# Patient Record
Sex: FEMALE | Race: WHITE | NOT HISPANIC OR LATINO | Employment: FULL TIME | URBAN - METROPOLITAN AREA
[De-identification: names, ages, dates, MRNs, and addresses within clinical notes are randomized per-mention and may not be internally consistent; named-entity substitution may affect disease eponyms.]

---

## 2017-01-04 ENCOUNTER — HOSPITAL ENCOUNTER (OUTPATIENT)
Dept: OTHER | Facility: HOSPITAL | Age: 57
Discharge: HOME | End: 2017-01-04
Attending: OBSTETRICS & GYNECOLOGY | Admitting: OBSTETRICS & GYNECOLOGY

## 2017-01-17 ENCOUNTER — HOSPITAL ENCOUNTER (OUTPATIENT)
Dept: MAMMOGRAPHY | Facility: HOSPITAL | Age: 57
Discharge: HOME | End: 2017-01-17
Attending: OBSTETRICS & GYNECOLOGY | Admitting: OBSTETRICS & GYNECOLOGY

## 2017-04-07 ENCOUNTER — HOSPITAL ENCOUNTER (OUTPATIENT)
Dept: OTHER | Facility: HOSPITAL | Age: 57
Discharge: HOME | End: 2017-04-07
Attending: FAMILY MEDICINE | Admitting: FAMILY MEDICINE

## 2017-07-21 ENCOUNTER — HOSPITAL ENCOUNTER (OUTPATIENT)
Dept: MAMMOGRAPHY | Facility: HOSPITAL | Age: 57
Discharge: HOME | End: 2017-07-21
Attending: OBSTETRICS & GYNECOLOGY | Admitting: OBSTETRICS & GYNECOLOGY

## 2018-07-13 ENCOUNTER — HOSPITAL ENCOUNTER (OUTPATIENT)
Dept: MAMMOGRAPHY | Facility: HOSPITAL | Age: 58
Discharge: HOME | End: 2018-07-13
Attending: OBSTETRICS & GYNECOLOGY | Admitting: OBSTETRICS & GYNECOLOGY

## 2018-08-13 ENCOUNTER — CONVERSION ENCOUNTER (OUTPATIENT)
Dept: FAMILY MEDICINE CLINIC | Facility: CLINIC | Age: 58
End: 2018-08-13

## 2018-08-13 LAB
ALBUMIN SERPL-MCNC: 4.4 G/DL (ref 3.6–5.1)
ALP SERPL-CCNC: 62 U/L (ref 33–130)
ALT SERPL-CCNC: 32 U/L (ref 6–29)
ANA SER QL IA: NEGATIVE
AST SERPL-CCNC: 20 U/L (ref 10–35)
BASOPHILS # BLD AUTO: 9 CELLS/UL (ref 0–200)
BASOPHILS NFR BLD AUTO: 0.1 %
BILIRUB SERPL-MCNC: 0.4 MG/DL (ref 0.2–1.2)
BUN SERPL-MCNC: 18 MG/DL (ref 7–25)
BUN/CREAT SERPL: ABNORMAL (CALC) (ref 6–22)
CALCIUM SERPL-MCNC: 9.6 MG/DL (ref 8.6–10.4)
CHLORIDE SERPL-SCNC: 104 MMOL/L (ref 98–110)
CHOLEST SERPL-MCNC: 237 MG/DL
CHOLEST/HDLC SERPL: 4 (CALC)
CHROMATIN AB SERPL-ACNC: <14 IU/ML
CONV CO2: 28 MMOL/L (ref 20–32)
CONV TOTAL PROTEIN: 6.8 G/DL (ref 6.1–8.1)
CREAT UR-MCNC: 0.77 MG/DL (ref 0.5–1.05)
CRP SERPL-MCNC: 4.1 MG/L
EOSINOPHIL # BLD AUTO: 0.1 %
EOSINOPHIL # BLD AUTO: 9 CELLS/UL (ref 15–500)
ERYTHROCYTE [DISTWIDTH] IN BLOOD BY AUTOMATED COUNT: 13.6 % (ref 11–15)
GLOBULIN UR ELPH-MCNC: 2.4 MG/DL (ref 1.9–3.7)
GLUCOSE UR QL: 106 MG/DL (ref 65–99)
HCT VFR BLD AUTO: 37.6 % (ref 35–45)
HDLC SERPL-MCNC: 60 MG/DL
HGB BLD-MCNC: 12.7 G/DL (ref 11.7–15.5)
INSULIN SERPL-ACNC: 1.8 (CALC) (ref 1–2.5)
LDLC SERPL CALC-MCNC: 151 MG/DL
LYMPHOCYTES # BLD AUTO: 3818 CELLS/UL (ref 850–3900)
LYMPHOCYTES NFR BLD AUTO: 44.4 %
MCH RBC QN AUTO: 30.5 PG (ref 27–33)
MCHC RBC AUTO-ENTMCNC: 33.8 G/DL (ref 32–36)
MCV RBC AUTO: 90.4 FL (ref 80–100)
MONOCYTES # BLD AUTO: 611 CELLS/UL (ref 200–950)
MONOCYTES NFR BLD AUTO: 7.1 %
NEUTROPHILS # BLD AUTO: 4154 CELLS/UL (ref 1500–7800)
NEUTROPHILS NFR BLD AUTO: 48.3 %
NONHDLC SERPL-MCNC: 177 MG/DL
PLATELET # BLD AUTO: 219 10*3/UL (ref 140–400)
PMV BLD AUTO: 11.4 FL (ref 7.5–12.5)
POTASSIUM SERPL-SCNC: 4.5 MMOL/L (ref 3.5–5.3)
RBC # BLD AUTO: 4.16 MILLION/UL (ref 3.8–5.1)
SODIUM SERPL-SCNC: 140 MMOL/L (ref 135–146)
TRIGL SERPL-MCNC: 140 MG/DL
TSH SERPL-ACNC: 2.77 MIU/L (ref 0.4–4.5)
URATE SERPL-MCNC: 4.7 MG/DL (ref 2.5–7)
WBC # BLD AUTO: 8.6 10*3/UL (ref 3.8–10.8)

## 2019-08-16 DIAGNOSIS — E78.5 HYPERLIPIDEMIA, UNSPECIFIED HYPERLIPIDEMIA TYPE: Primary | ICD-10-CM

## 2019-08-17 LAB
ALBUMIN SERPL-MCNC: 5.2 G/DL (ref 3.5–5.5)
ALBUMIN/GLOB SERPL: 2.5 {RATIO} (ref 1.2–2.2)
ALP SERPL-CCNC: 83 IU/L (ref 39–117)
ALT SERPL-CCNC: 26 IU/L (ref 0–32)
AST SERPL-CCNC: 24 IU/L (ref 0–40)
BASOPHILS # BLD AUTO: 0 X10E3/UL (ref 0–0.2)
BASOPHILS NFR BLD AUTO: 0 %
BILIRUB SERPL-MCNC: 0.4 MG/DL (ref 0–1.2)
BUN SERPL-MCNC: 11 MG/DL (ref 6–24)
BUN/CREAT SERPL: 18 (ref 9–23)
CALCIUM SERPL-MCNC: 10.4 MG/DL (ref 8.7–10.2)
CHLORIDE SERPL-SCNC: 101 MMOL/L (ref 96–106)
CHOLEST SERPL-MCNC: 268 MG/DL (ref 100–199)
CHOLEST/HDLC SERPL: 4.3 RATIO (ref 0–4.4)
CO2 SERPL-SCNC: 24 MMOL/L (ref 20–29)
CREAT SERPL-MCNC: 0.6 MG/DL (ref 0.57–1)
EOSINOPHIL # BLD AUTO: 0.1 X10E3/UL (ref 0–0.4)
EOSINOPHIL NFR BLD AUTO: 2 %
ERYTHROCYTE [DISTWIDTH] IN BLOOD BY AUTOMATED COUNT: 14 % (ref 12.3–15.4)
GLOBULIN SER CALC-MCNC: 2.1 G/DL (ref 1.5–4.5)
GLUCOSE SERPL-MCNC: 104 MG/DL (ref 65–99)
HCT VFR BLD AUTO: 42.5 % (ref 34–46.6)
HDLC SERPL-MCNC: 62 MG/DL
HGB BLD-MCNC: 14.1 G/DL (ref 11.1–15.9)
IMM GRANULOCYTES # BLD AUTO: 0 X10E3/UL (ref 0–0.1)
IMM GRANULOCYTES NFR BLD AUTO: 0 %
LDLC SERPL CALC-MCNC: 176 MG/DL (ref 0–99)
LYMPHOCYTES # BLD AUTO: 2.7 X10E3/UL (ref 0.7–3.1)
LYMPHOCYTES NFR BLD AUTO: 50 %
MCH RBC QN AUTO: 30.8 PG (ref 26.6–33)
MCHC RBC AUTO-ENTMCNC: 33.2 G/DL (ref 31.5–35.7)
MCV RBC AUTO: 93 FL (ref 79–97)
MONOCYTES # BLD AUTO: 0.3 X10E3/UL (ref 0.1–0.9)
MONOCYTES NFR BLD AUTO: 5 %
NEUTROPHILS # BLD AUTO: 2.4 X10E3/UL (ref 1.4–7)
NEUTROPHILS NFR BLD AUTO: 43 %
PLATELET # BLD AUTO: 222 X10E3/UL (ref 150–450)
POTASSIUM SERPL-SCNC: 4.7 MMOL/L (ref 3.5–5.2)
PROT SERPL-MCNC: 7.3 G/DL (ref 6–8.5)
RBC # BLD AUTO: 4.58 X10E6/UL (ref 3.77–5.28)
SODIUM SERPL-SCNC: 145 MMOL/L (ref 134–144)
TRIGL SERPL-MCNC: 150 MG/DL (ref 0–149)
TSH SERPL DL<=0.005 MIU/L-ACNC: 4.63 UIU/ML (ref 0.45–4.5)
VLDLC SERPL CALC-MCNC: 30 MG/DL (ref 5–40)
WBC # BLD AUTO: 5.5 X10E3/UL (ref 3.4–10.8)

## 2019-08-19 DIAGNOSIS — M54.5 LOW BACK PAIN, UNSPECIFIED BACK PAIN LATERALITY, UNSPECIFIED CHRONICITY, WITH SCIATICA PRESENCE UNSPECIFIED: Primary | ICD-10-CM

## 2019-08-20 RX ORDER — MELOXICAM 15 MG/1
TABLET ORAL
Qty: 90 TABLET | Refills: 4 | Status: SHIPPED | OUTPATIENT
Start: 2019-08-20

## 2019-09-06 ENCOUNTER — OFFICE VISIT (OUTPATIENT)
Dept: FAMILY MEDICINE CLINIC | Facility: CLINIC | Age: 59
End: 2019-09-06

## 2019-09-06 VITALS
DIASTOLIC BLOOD PRESSURE: 78 MMHG | SYSTOLIC BLOOD PRESSURE: 138 MMHG | TEMPERATURE: 98.9 F | WEIGHT: 156.6 LBS | HEIGHT: 64 IN | HEART RATE: 103 BPM | BODY MASS INDEX: 26.73 KG/M2 | OXYGEN SATURATION: 98 % | RESPIRATION RATE: 17 BRPM

## 2019-09-06 DIAGNOSIS — E78.2 MIXED HYPERLIPIDEMIA: ICD-10-CM

## 2019-09-06 DIAGNOSIS — M54.41 ACUTE RIGHT-SIDED LOW BACK PAIN WITH RIGHT-SIDED SCIATICA: ICD-10-CM

## 2019-09-06 DIAGNOSIS — E66.3 OVERWEIGHT: ICD-10-CM

## 2019-09-06 DIAGNOSIS — Z00.01 ENCOUNTER FOR ANNUAL GENERAL MEDICAL EXAMINATION WITH ABNORMAL FINDINGS IN ADULT: Primary | ICD-10-CM

## 2019-09-06 DIAGNOSIS — B35.1 ONYCHOMYCOSIS: ICD-10-CM

## 2019-09-06 DIAGNOSIS — Z12.11 COLON CANCER SCREENING: ICD-10-CM

## 2019-09-06 PROBLEM — J45.909 ASTHMA: Status: ACTIVE | Noted: 2019-09-06

## 2019-09-06 PROBLEM — K58.9 IBS (IRRITABLE BOWEL SYNDROME): Status: ACTIVE | Noted: 2019-09-06

## 2019-09-06 PROBLEM — K13.79 LESION OF PALATE: Status: ACTIVE | Noted: 2019-09-06

## 2019-09-06 PROBLEM — R06.02 SHORT OF BREATH ON EXERTION: Status: ACTIVE | Noted: 2019-09-06

## 2019-09-06 PROBLEM — M79.673 FOOT PAIN: Status: ACTIVE | Noted: 2019-09-06

## 2019-09-06 PROBLEM — M19.90 ARTHRITIS: Status: ACTIVE | Noted: 2019-09-06

## 2019-09-06 PROBLEM — M19.90 OA (OSTEOARTHRITIS): Status: ACTIVE | Noted: 2019-09-06

## 2019-09-06 PROBLEM — M54.6 ACUTE BILATERAL THORACIC BACK PAIN: Status: ACTIVE | Noted: 2019-09-06

## 2019-09-06 PROCEDURE — 99396 PREV VISIT EST AGE 40-64: CPT | Performed by: FAMILY MEDICINE

## 2019-09-06 RX ORDER — BIOTIN 2500 MCG
1 CAPSULE ORAL DAILY
COMMUNITY
End: 2020-03-04

## 2019-09-06 RX ORDER — ALBUTEROL SULFATE 2.5 MG/3ML
2.5 SOLUTION RESPIRATORY (INHALATION)
COMMUNITY
Start: 2016-12-16 | End: 2020-03-04

## 2019-09-06 RX ORDER — MONTELUKAST SODIUM 10 MG/1
10 TABLET ORAL DAILY
Refills: 12 | COMMUNITY
Start: 2019-07-15 | End: 2019-10-16 | Stop reason: SDUPTHER

## 2019-09-06 RX ORDER — PROMETHAZINE HYDROCHLORIDE 25 MG/1
.5-1 TABLET ORAL AS NEEDED
COMMUNITY
Start: 2018-12-19 | End: 2022-09-23

## 2019-09-06 RX ORDER — CYCLOBENZAPRINE HCL 10 MG
.5-1 TABLET ORAL NIGHTLY PRN
Refills: 2 | COMMUNITY
Start: 2019-08-18 | End: 2019-09-17 | Stop reason: SDUPTHER

## 2019-09-06 RX ORDER — TERBINAFINE HYDROCHLORIDE 250 MG/1
250 TABLET ORAL DAILY
Qty: 14 TABLET | Refills: 9 | Status: SHIPPED | OUTPATIENT
Start: 2019-09-06 | End: 2020-10-02

## 2019-09-06 RX ORDER — ALBUTEROL SULFATE 90 UG/1
2 AEROSOL, METERED RESPIRATORY (INHALATION) EVERY 4 HOURS
COMMUNITY
Start: 2016-10-07

## 2019-09-06 RX ORDER — ASCORBIC ACID 1000 MG
1 TABLET ORAL DAILY
COMMUNITY
End: 2020-03-04

## 2019-09-06 RX ORDER — NAPROXEN SODIUM 220 MG
TABLET ORAL
COMMUNITY
End: 2020-03-04

## 2019-09-06 NOTE — PROGRESS NOTES
Subjective   Asia Medellin is a 59 y.o. female.     Chief Complaint   Patient presents with   • Annual Exam   • Hyperlipidemia   • Follow-up     elevated tsh 8/16/2019       The patient is here: to discuss health maintenance and disease prevention.  Last comprehensive physical was on 8/10/2018.  Previous physical was performed by  Randolph Wilson MD  Overall has: moderate activity with work/home activities, good appetite, feels well with minor complaints, decreased energy level and is sleeping well. Nutrition: eating a variety of foods. Last tetanus shot was 5-10 years ago. occasionally. Patient's last PAP Smear was: 2018. Unknown. The Patient's last Mammogram was: 7/13/2018. Patient's last colonoscopy was: 11/13/2015.     Hyperlipidemia   This is a chronic problem. The current episode started more than 1 year ago. The problem is uncontrolled. Recent lipid tests were reviewed and are high. Pertinent negatives include no chest pain or shortness of breath.        Recent Hospitalizations:  No hospitalization(s) within the last year..  ccc      I personally reviewed and updated the patient's allergies, medications, problem list, and past medical, surgical, social, and family history.     Family History   Problem Relation Age of Onset   • Diabetes Mother         Mellitus   • Hypertension Mother    • Osteoporosis Mother    • Skin cancer Father    • Leukemia Father    • Diabetes Sister         Mellitus   • Hypertension Sister    • Osteoporosis Sister    • Diabetes Brother         Mellitus   • Hypertension Brother    • Diabetes Maternal Grandmother         Mellitus   • Colon cancer Maternal Grandfather    • Prostate cancer Paternal Grandfather        Social History     Tobacco Use   • Smoking status: Never Smoker   • Smokeless tobacco: Never Used   Substance Use Topics   • Alcohol use: Yes     Frequency: Monthly or less     Comment: Occasional   • Drug use: No       History reviewed. No pertinent surgical history.    Patient  Active Problem List   Diagnosis   • Acute bilateral thoracic back pain   • Acute right-sided low back pain with right-sided sciatica   • Annual visit for general adult medical examination with abnormal findings   • Arthritis   • OA (osteoarthritis)   • Asthma   • Colon cancer screening   • Foot pain   • Mixed hyperlipidemia   • IBS (irritable bowel syndrome)   • Lesion of palate   • Overweight   • Short of breath on exertion   • Onychomycosis         Current Outpatient Medications:   •  albuterol (PROVENTIL) (2.5 MG/3ML) 0.083% nebulizer solution, Inhale 2.5 mg., Disp: , Rfl:   •  albuterol sulfate HFA (PROAIR HFA) 108 (90 Base) MCG/ACT inhaler, Inhale 2 puffs Every 4 (Four) Hours., Disp: , Rfl:   •  Biotin 2500 MCG capsule, Take 1 capsule by mouth Daily., Disp: , Rfl:   •  CINNAMON PO, Take  by mouth., Disp: , Rfl:   •  cyclobenzaprine (FLEXERIL) 10 MG tablet, Take 0.5-1 tablets by mouth At Night As Needed., Disp: , Rfl: 2  •  Ginger, Zingiber officinalis, (GINGER ROOT) 500 MG capsule, Take 1 capsule by mouth Daily., Disp: , Rfl:   •  meloxicam (MOBIC) 15 MG tablet, TAKE 1 TABLET BY MOUTH EVERY DAY, Disp: 90 tablet, Rfl: 4  •  montelukast (SINGULAIR) 10 MG tablet, Take 10 mg by mouth Daily., Disp: , Rfl: 12  •  naproxen sodium (ALEVE) 220 MG tablet, Take  by mouth., Disp: , Rfl:   •  promethazine (PHENERGAN) 25 MG tablet, Take 0.5-1 tablets by mouth As Needed for Nausea., Disp: , Rfl:   •  Turmeric Curcumin 500 MG capsule, Take 1 capsule by mouth Daily., Disp: , Rfl:   •  Multiple Vitamins-Minerals (MULTIVITAMIN ADULT PO), Take  by mouth Daily., Disp: , Rfl:   •  Plant Sterols and Stanols (CHOLEST OFF PO), Take  by mouth., Disp: , Rfl:   •  terbinafine (lamiSIL) 250 MG tablet, Take 1 tablet by mouth Daily. Take medications the first week, off x 3 weeks.  Repeat cycle., Disp: 14 tablet, Rfl: 9         Review of Systems   Constitutional: Negative for chills and diaphoresis.   HENT: Negative for trouble swallowing and  "voice change.    Eyes: Negative for visual disturbance.   Respiratory: Negative for shortness of breath.    Cardiovascular: Negative for chest pain and palpitations.   Gastrointestinal: Negative for abdominal pain and nausea.   Endocrine: Negative for polydipsia and polyphagia.   Genitourinary: Negative for hematuria.   Musculoskeletal: Negative for neck stiffness.   Skin: Negative for color change and pallor.   Allergic/Immunologic: Negative for immunocompromised state.   Neurological: Negative for seizures and syncope.   Hematological: Negative for adenopathy.   Psychiatric/Behavioral: Negative for sleep disturbance and suicidal ideas.       Objective   /78   Pulse 103   Temp 98.9 °F (37.2 °C)   Resp 17   Ht 161.3 cm (63.5\")   Wt 71 kg (156 lb 9.6 oz)   SpO2 98%   BMI 27.31 kg/m²   Wt Readings from Last 3 Encounters:   09/06/19 71 kg (156 lb 9.6 oz)   05/03/19 74 kg (163 lb 3.2 oz)   12/19/18 76.2 kg (167 lb 16 oz)     Physical Exam   Constitutional: She is oriented to person, place, and time. She appears well-developed and well-nourished.   HENT:   Head: Normocephalic.   Right Ear: Tympanic membrane, external ear and ear canal normal.   Left Ear: Tympanic membrane, external ear and ear canal normal.   Nose: Nose normal.   Eyes: Conjunctivae, EOM and lids are normal. Pupils are equal, round, and reactive to light.   Neck: No JVD present. Carotid bruit is not present. No tracheal deviation present. No thyromegaly present.   Cardiovascular: Normal rate, regular rhythm, normal heart sounds and intact distal pulses. Exam reveals no gallop and no friction rub.   No murmur heard.  Pulmonary/Chest: Effort normal and breath sounds normal. No stridor. She has no decreased breath sounds. She has no wheezes. She has no rales.   Abdominal: Soft. Bowel sounds are normal. She exhibits no distension and no mass. There is no tenderness. There is no rebound and no guarding. No hernia.   Lymphadenopathy:        Head " (right side): No submental, no submandibular, no tonsillar, no preauricular, no posterior auricular and no occipital adenopathy present.        Head (left side): No submental, no submandibular, no tonsillar, no preauricular, no posterior auricular and no occipital adenopathy present.     She has no cervical adenopathy.   Neurological: She is alert and oriented to person, place, and time. She has normal strength and normal reflexes. No cranial nerve deficit or sensory deficit. Coordination and gait normal.   Skin: Skin is warm and dry. Turgor is normal. She is not diaphoretic. No pallor.       Recent Lab Results:    Lab Results   Component Value Date     (H) 08/16/2019        Lab Results   Component Value Date    CHOL 237 (H) 08/13/2018    TRIG 150 (H) 08/16/2019    HDL 62 08/16/2019    VLDL 30 08/16/2019     LDL Cholesterol    Date Value Ref Range Status   08/16/2019 176 (H) 0 - 99 mg/dL Final   08/13/2018 151 (H) NR Final   09/30/2016 166 (H) <130 mg/dL Final     No results found for: PSA  Lab Results   Component Value Date    WBC 5.5 08/16/2019    HGB 14.1 08/16/2019    HCT 42.5 08/16/2019    MCV 93 08/16/2019     08/16/2019     Lab Results   Component Value Date    TSH 4.630 (H) 08/16/2019     Lab Results   Component Value Date    BUN 11 08/16/2019    CREATININE 0.60 08/16/2019    EGFRIFNONA 100 08/16/2019    EGFRIFAFRI 115 08/16/2019    BCR 18 08/16/2019    K 4.7 08/16/2019    CO2 24 08/16/2019    CALCIUM 10.4 (H) 08/16/2019    PROTENTOTREF 7.3 08/16/2019    ALBUMIN 5.2 08/16/2019    LABIL2 2.5 (H) 08/16/2019    AST 24 08/16/2019    ALT 26 08/16/2019         Age-appropriate Screening Schedule:  Refer to the list below for future screening recommendations based on patient's age, sex and/or medical conditions. Orders for these recommended tests are listed in the plan section. The patient has been provided with a written plan.    Health Maintenance   Topic Date Due   • TDAP/TD VACCINES (1 - Tdap)  03/21/1979   • ZOSTER VACCINE (1 of 2) 03/21/2010   • INFLUENZA VACCINE  08/01/2019   • PAP SMEAR  08/16/2019   • MAMMOGRAM  07/13/2020   • LIPID PANEL  08/16/2020   • COLONOSCOPY  11/13/2025           Assessment/Plan   Problem List Items Addressed This Visit        Cardiovascular and Mediastinum    Mixed hyperlipidemia    Overview     Moderate elevation, add red yeast rice, fish oil  Discussed diet, exercise, lifestyle modification  Follow-up recheck  Consider statin if persistent elevation            Nervous and Auditory    Acute right-sided low back pain with right-sided sciatica    Overview     strain  ice 20 minutes bid  nsaids  Rehabilitation exercises discussed.  Consider imaging if persistent symptoms.            Musculoskeletal and Integument    Onychomycosis    Relevant Medications    terbinafine (lamiSIL) 250 MG tablet       Other    Annual visit for general adult medical examination with abnormal findings - Primary    Overview     Followed by OB/GYN  Doing well, vaccines current  Discussed calcium, vitamin D    Age specific anticipatory guidance and warning signs discussed.  Diet, exercise, and lifestyle modification discussed.  Safety, seatbelts, and routine screening examinations discussed.  Discussed self-examinations.         Colon cancer screening    Overview     recommend colonoscopy, she plans to schedule later this year         Overweight              Expected course, medications, and adverse effects discussed.  Call or return if worsening or persistent symptoms.

## 2019-09-17 DIAGNOSIS — M54.41 ACUTE RIGHT-SIDED LOW BACK PAIN WITH RIGHT-SIDED SCIATICA: Primary | ICD-10-CM

## 2019-09-17 RX ORDER — CYCLOBENZAPRINE HCL 10 MG
5-10 TABLET ORAL NIGHTLY PRN
Qty: 30 TABLET | Refills: 2 | Status: SHIPPED | OUTPATIENT
Start: 2019-09-17 | End: 2020-01-03

## 2019-10-16 DIAGNOSIS — J30.9 ALLERGIC RHINITIS, UNSPECIFIED SEASONALITY, UNSPECIFIED TRIGGER: Primary | ICD-10-CM

## 2019-10-16 RX ORDER — MONTELUKAST SODIUM 10 MG/1
TABLET ORAL
Qty: 90 TABLET | Refills: 4 | Status: SHIPPED | OUTPATIENT
Start: 2019-10-16 | End: 2020-10-19

## 2020-01-02 DIAGNOSIS — M54.41 ACUTE RIGHT-SIDED LOW BACK PAIN WITH RIGHT-SIDED SCIATICA: ICD-10-CM

## 2020-01-03 RX ORDER — CYCLOBENZAPRINE HCL 10 MG
TABLET ORAL
Qty: 30 TABLET | Refills: 2 | Status: SHIPPED | OUTPATIENT
Start: 2020-01-03 | End: 2020-04-10

## 2020-01-15 ENCOUNTER — TRANSCRIBE ORDERS (OUTPATIENT)
Dept: ADMINISTRATIVE | Facility: HOSPITAL | Age: 60
End: 2020-01-15

## 2020-01-15 DIAGNOSIS — Z12.31 VISIT FOR SCREENING MAMMOGRAM: Primary | ICD-10-CM

## 2020-01-24 ENCOUNTER — HOSPITAL ENCOUNTER (OUTPATIENT)
Dept: MAMMOGRAPHY | Facility: HOSPITAL | Age: 60
Discharge: HOME | End: 2020-01-24
Admitting: OBSTETRICS & GYNECOLOGY

## 2020-01-24 DIAGNOSIS — Z12.31 VISIT FOR SCREENING MAMMOGRAM: ICD-10-CM

## 2020-01-24 PROCEDURE — 77063 BREAST TOMOSYNTHESIS BI: CPT

## 2020-01-24 PROCEDURE — 77067 SCR MAMMO BI INCL CAD: CPT

## 2020-03-04 ENCOUNTER — OFFICE VISIT (OUTPATIENT)
Dept: FAMILY MEDICINE CLINIC | Facility: CLINIC | Age: 60
End: 2020-03-04

## 2020-03-04 VITALS
TEMPERATURE: 99.3 F | HEIGHT: 64 IN | DIASTOLIC BLOOD PRESSURE: 94 MMHG | OXYGEN SATURATION: 96 % | HEART RATE: 88 BPM | SYSTOLIC BLOOD PRESSURE: 136 MMHG | RESPIRATION RATE: 18 BRPM | WEIGHT: 150 LBS | BODY MASS INDEX: 25.61 KG/M2

## 2020-03-04 DIAGNOSIS — J01.00 ACUTE NON-RECURRENT MAXILLARY SINUSITIS: Primary | ICD-10-CM

## 2020-03-04 DIAGNOSIS — E66.3 OVERWEIGHT: ICD-10-CM

## 2020-03-04 DIAGNOSIS — B34.9 VIRAL SYNDROME: ICD-10-CM

## 2020-03-04 PROCEDURE — 99213 OFFICE O/P EST LOW 20 MIN: CPT | Performed by: FAMILY MEDICINE

## 2020-03-04 RX ORDER — DOXYCYCLINE 100 MG/1
100 CAPSULE ORAL 2 TIMES DAILY
Qty: 20 CAPSULE | Refills: 0 | Status: SHIPPED | OUTPATIENT
Start: 2020-03-04 | End: 2022-09-23

## 2020-03-04 RX ORDER — VITAMIN B COMPLEX
1 TABLET ORAL DAILY
COMMUNITY

## 2020-03-04 RX ORDER — AMPICILLIN TRIHYDRATE 250 MG
1 CAPSULE ORAL DAILY
COMMUNITY

## 2020-03-04 NOTE — PROGRESS NOTES
Subjective   Asia Medellin is a 59 y.o. female.     Chief Complaint   Patient presents with   • Hoarse       Hoarse   This is a new problem. The current episode started in the past 7 days. The problem has been gradually worsening. Associated symptoms include congestion, fatigue (not feeling well) and a sore throat. Pertinent negatives include no abdominal pain, arthralgias, chest pain, chills, coughing, fever, headaches, myalgias, nausea, rash or vomiting. Treatments tried: salt water rinse. The treatment provided no relief.        The following portions of the patient's history were reviewed and updated as appropriate: allergies, current medications, past family history, past medical history, past social history, past surgical history and problem list.    Allergies:  Allergies   Allergen Reactions   • Penicillin G Rash       Social History:  Social History     Socioeconomic History   • Marital status:      Spouse name: Not on file   • Number of children: Not on file   • Years of education: Not on file   • Highest education level: Not on file   Tobacco Use   • Smoking status: Never Smoker   • Smokeless tobacco: Never Used   Substance and Sexual Activity   • Alcohol use: Yes     Frequency: Monthly or less     Drinks per session: 1 or 2     Binge frequency: Never     Comment: Occasional   • Drug use: No   • Sexual activity: Defer       Family History:  Family History   Problem Relation Age of Onset   • Diabetes Mother         Mellitus   • Hypertension Mother    • Osteoporosis Mother    • Skin cancer Father    • Leukemia Father    • Diabetes Sister         Mellitus   • Hypertension Sister    • Osteoporosis Sister    • Diabetes Brother         Mellitus   • Hypertension Brother    • Diabetes Maternal Grandmother         Mellitus   • Colon cancer Maternal Grandfather    • Prostate cancer Paternal Grandfather        Past Medical History :  Patient Active Problem List   Diagnosis   • Acute bilateral thoracic back  pain   • Acute right-sided low back pain with right-sided sciatica   • Annual visit for general adult medical examination with abnormal findings   • Arthritis   • OA (osteoarthritis)   • Asthma   • Colon cancer screening   • Foot pain   • Mixed hyperlipidemia   • IBS (irritable bowel syndrome)   • Lesion of palate   • Overweight   • Short of breath on exertion   • Onychomycosis       Medication List:  Outpatient Encounter Medications as of 3/4/2020   Medication Sig Dispense Refill   • albuterol sulfate HFA (PROAIR HFA) 108 (90 Base) MCG/ACT inhaler Inhale 2 puffs Every 4 (Four) Hours.     • Cholecalciferol (VITAMIN D) 25 MCG (1000 UT) tablet Take 1 tablet by mouth Daily.     • cyclobenzaprine (FLEXERIL) 10 MG tablet TAKE 1/2-1 TABLET BY MOUTH AT NIGHT AS NEEDED FOR MUSCLE SPASMS. 30 tablet 2   • meloxicam (MOBIC) 15 MG tablet TAKE 1 TABLET BY MOUTH EVERY DAY 90 tablet 4   • montelukast (SINGULAIR) 10 MG tablet TAKE 1 TABLET BY MOUTH EVERY DAY 90 tablet 4   • promethazine (PHENERGAN) 25 MG tablet Take 0.5-1 tablets by mouth As Needed for Nausea.     • Red Yeast Rice 600 MG capsule Take 1 capsule by mouth Daily.     • terbinafine (lamiSIL) 250 MG tablet Take 1 tablet by mouth Daily. Take medications the first week, off x 3 weeks.  Repeat cycle. 14 tablet 9   • doxycycline (MONODOX) 100 MG capsule Take 1 capsule by mouth 2 (Two) Times a Day. 20 capsule 0   • [DISCONTINUED] albuterol (PROVENTIL) (2.5 MG/3ML) 0.083% nebulizer solution Take 2.5 mg by nebulization 4 (Four) Times a Day.     • [DISCONTINUED] Biotin 2500 MCG capsule Take 1 capsule by mouth Daily.     • [DISCONTINUED] CINNAMON PO Take  by mouth.     • [DISCONTINUED] Ginger, Zingiber officinalis, (GINGER ROOT) 500 MG capsule Take 1 capsule by mouth Daily.     • [DISCONTINUED] Multiple Vitamins-Minerals (MULTIVITAMIN ADULT PO) Take  by mouth Daily.     • [DISCONTINUED] naproxen sodium (ALEVE) 220 MG tablet Take  by mouth.     • [DISCONTINUED] Plant Sterols and  "Stanols (CHOLEST OFF PO) Take  by mouth.     • [DISCONTINUED] Turmeric Curcumin 500 MG capsule Take 1 capsule by mouth Daily.       No facility-administered encounter medications on file as of 3/4/2020.        Past Surgical History:  No past surgical history on file.    Review of Systems:  Review of Systems   Constitutional: Positive for fatigue (not feeling well). Negative for chills and fever.   HENT: Positive for congestion, hoarse voice, sinus pressure and sore throat.    Respiratory: Negative for cough.    Cardiovascular: Negative for chest pain and palpitations.   Gastrointestinal: Negative for abdominal pain, nausea and vomiting.   Endocrine: Negative for cold intolerance, heat intolerance, polydipsia and polyuria.   Genitourinary: Negative for dysuria.   Musculoskeletal: Negative for arthralgias and myalgias.   Skin: Negative for rash.   Hematological: Negative for adenopathy. Does not bruise/bleed easily.       I have reviewed and confirmed the accuracy of the ROS as documented by the MA/LPN/RN Carmen Washington MD    Vital Signs:  Visit Vitals  /94 (BP Location: Left arm, Patient Position: Sitting, Cuff Size: Adult)   Pulse 88   Temp 99.3 °F (37.4 °C) (Oral)   Resp 18   Ht 161.3 cm (63.5\")   Wt 68 kg (150 lb)   SpO2 96% Comment: Room air   BMI 26.15 kg/m²       Physical Exam   Constitutional: She is oriented to person, place, and time. She appears well-developed and well-nourished. No distress.   HENT:   Right Ear: Tympanic membrane and external ear normal.   Left Ear: Tympanic membrane and external ear normal.   Nose: Right sinus exhibits maxillary sinus tenderness. Left sinus exhibits maxillary sinus tenderness.   Mouth/Throat: Oropharynx is clear and moist. No oropharyngeal exudate (moderate post nasal secretions ).   Eyes: Conjunctivae are normal.   Neck: Neck supple. No JVD present. No thyromegaly present.   Cardiovascular: Normal rate, regular rhythm, normal heart sounds and intact distal " pulses. Exam reveals no gallop and no friction rub.   No murmur heard.  Pulmonary/Chest: Effort normal and breath sounds normal. No respiratory distress. She has no wheezes. She has no rales.   Musculoskeletal: She exhibits no edema.   Lymphadenopathy:     She has no cervical adenopathy.   Neurological: She is alert and oriented to person, place, and time. She displays normal reflexes. Coordination normal.   Skin: Skin is warm. No rash noted. No erythema.       Assessment and Plan:  Problem List Items Addressed This Visit        Unprioritized    Overweight    Overview     Diet exercise and wt loss discussed and encouraged.            Other Visit Diagnoses     Acute non-recurrent maxillary sinusitis    -  Primary    Fluids saline nose drops, abx and follow up should sxs don't improve and resolve over 1 week.     Relevant Medications    doxycycline (MONODOX) 100 MG capsule    Viral syndrome        with associated layingitis humidity and follow.          An After Visit Summary and PPPS were given to the patient.

## 2020-04-10 DIAGNOSIS — M54.41 ACUTE RIGHT-SIDED LOW BACK PAIN WITH RIGHT-SIDED SCIATICA: ICD-10-CM

## 2020-04-10 RX ORDER — CYCLOBENZAPRINE HCL 10 MG
TABLET ORAL
Qty: 30 TABLET | Refills: 2 | Status: SHIPPED | OUTPATIENT
Start: 2020-04-10 | End: 2020-07-27

## 2020-07-25 DIAGNOSIS — M54.41 ACUTE RIGHT-SIDED LOW BACK PAIN WITH RIGHT-SIDED SCIATICA: ICD-10-CM

## 2020-07-27 RX ORDER — CYCLOBENZAPRINE HCL 10 MG
TABLET ORAL
Qty: 30 TABLET | Refills: 2 | Status: SHIPPED | OUTPATIENT
Start: 2020-07-27 | End: 2021-04-28

## 2020-09-11 ENCOUNTER — OFFICE VISIT (OUTPATIENT)
Dept: FAMILY MEDICINE CLINIC | Facility: CLINIC | Age: 60
End: 2020-09-11

## 2020-09-11 VITALS
DIASTOLIC BLOOD PRESSURE: 90 MMHG | SYSTOLIC BLOOD PRESSURE: 120 MMHG | HEIGHT: 64 IN | HEART RATE: 63 BPM | BODY MASS INDEX: 24.75 KG/M2 | OXYGEN SATURATION: 100 % | WEIGHT: 145 LBS | TEMPERATURE: 97.5 F | RESPIRATION RATE: 18 BRPM

## 2020-09-11 DIAGNOSIS — E78.2 MIXED HYPERLIPIDEMIA: ICD-10-CM

## 2020-09-11 DIAGNOSIS — H93.13 TINNITUS OF BOTH EARS: ICD-10-CM

## 2020-09-11 DIAGNOSIS — Z23 NEED FOR PNEUMOCOCCAL VACCINATION: ICD-10-CM

## 2020-09-11 DIAGNOSIS — Z00.01 ANNUAL VISIT FOR GENERAL ADULT MEDICAL EXAMINATION WITH ABNORMAL FINDINGS: Primary | ICD-10-CM

## 2020-09-11 LAB
BILIRUB BLD-MCNC: NEGATIVE MG/DL
CLARITY, POC: CLEAR
COLOR UR: YELLOW
GLUCOSE UR STRIP-MCNC: NEGATIVE MG/DL
KETONES UR QL: NEGATIVE
LEUKOCYTE EST, POC: NEGATIVE
NITRITE UR-MCNC: NEGATIVE MG/ML
PH UR: 5 [PH] (ref 5–8)
PROT UR STRIP-MCNC: NEGATIVE MG/DL
RBC # UR STRIP: NEGATIVE /UL
SP GR UR: 1.01 (ref 1–1.03)
UROBILINOGEN UR QL: NORMAL

## 2020-09-11 PROCEDURE — 81002 URINALYSIS NONAUTO W/O SCOPE: CPT | Performed by: FAMILY MEDICINE

## 2020-09-11 PROCEDURE — 99396 PREV VISIT EST AGE 40-64: CPT | Performed by: FAMILY MEDICINE

## 2020-09-11 PROCEDURE — 90471 IMMUNIZATION ADMIN: CPT | Performed by: FAMILY MEDICINE

## 2020-09-11 PROCEDURE — 90732 PPSV23 VACC 2 YRS+ SUBQ/IM: CPT | Performed by: FAMILY MEDICINE

## 2020-09-11 NOTE — PROGRESS NOTES
Subjective   Asia Medellin is a 60 y.o. female.     Chief Complaint   Patient presents with   • Annual Exam       The patient is here: for coordination of medical care to discuss health maintenance and disease prevention.  Last comprehensive physical was on 09/06/2019.  Previous physical was performed by  Randolph Wilson MD  Overall has: moderate activity with work/home activities, exercises 2 - 3 times per week, good appetite, feels well with no complaints, good energy level and is sleeping well. Nutrition: balanced diet. Last tetanus shot was unknown. Patient is doing routine self breast exams: monthly Patient's last PAP Smear was: 01/2020 with Dr. Echavarria. Patient's last DEXA Scan was: no prior. The Patient's last Mammogram was: 01/15/2020. Patient's last colonoscopy was: 11/13/2015 Repeat 2025.    History of Present Illness     Recent Hospitalizations:  No hospitalization(s) within the last year..  ccc      I personally reviewed and updated the patient's allergies, medications, problem list, and past medical, surgical, social, and family history. I have reviewed and confirmed the accuracy of the HPI and ROS as documented by the MA/LPN/RN Randolph Wilson MD    Family History   Problem Relation Age of Onset   • Diabetes Mother         Mellitus   • Hypertension Mother    • Osteoporosis Mother    • Skin cancer Father    • Leukemia Father    • Diabetes Sister         Mellitus   • Hypertension Sister    • Osteoporosis Sister    • Diabetes Maternal Grandmother         Mellitus   • Colon cancer Maternal Grandfather    • Prostate cancer Paternal Grandfather    • Alzheimer's disease Paternal Grandmother        Social History     Tobacco Use   • Smoking status: Never Smoker   • Smokeless tobacco: Never Used   Substance Use Topics   • Alcohol use: Yes     Frequency: Monthly or less     Drinks per session: 1 or 2     Binge frequency: Never     Comment: Occasional   • Drug use: No       Past Surgical History:   Procedure  Laterality Date   • FACIAL LACERATIONS REPAIR  1977       Patient Active Problem List   Diagnosis   • Acute bilateral thoracic back pain   • Acute right-sided low back pain with right-sided sciatica   • Annual visit for general adult medical examination with abnormal findings   • Arthritis   • OA (osteoarthritis)   • Asthma   • Colon cancer screening   • Foot pain   • Mixed hyperlipidemia   • IBS (irritable bowel syndrome)   • Lesion of palate   • Overweight   • Short of breath on exertion   • Onychomycosis   • Tinnitus of both ears         Current Outpatient Medications:   •  albuterol sulfate HFA (PROAIR HFA) 108 (90 Base) MCG/ACT inhaler, Inhale 2 puffs Every 4 (Four) Hours., Disp: , Rfl:   •  Cholecalciferol (VITAMIN D) 25 MCG (1000 UT) tablet, Take 1 tablet by mouth Daily., Disp: , Rfl:   •  cyclobenzaprine (FLEXERIL) 10 MG tablet, TAKE 1/2-1 TABLET BY MOUTH IN THE EVENING AS NEEDED FOR MUSCLE SPASMS, Disp: 30 tablet, Rfl: 2  •  meloxicam (MOBIC) 15 MG tablet, TAKE 1 TABLET BY MOUTH EVERY DAY, Disp: 90 tablet, Rfl: 4  •  montelukast (SINGULAIR) 10 MG tablet, TAKE 1 TABLET BY MOUTH EVERY DAY, Disp: 90 tablet, Rfl: 4  •  Red Yeast Rice 600 MG capsule, Take 1 capsule by mouth Daily., Disp: , Rfl:   •  terbinafine (lamiSIL) 250 MG tablet, Take 1 tablet by mouth Daily. Take medications the first week, off x 3 weeks.  Repeat cycle., Disp: 14 tablet, Rfl: 9  •  doxycycline (MONODOX) 100 MG capsule, Take 1 capsule by mouth 2 (Two) Times a Day., Disp: 20 capsule, Rfl: 0  •  promethazine (PHENERGAN) 25 MG tablet, Take 0.5-1 tablets by mouth As Needed for Nausea., Disp: , Rfl:          Review of Systems   Constitutional: Negative for chills and diaphoresis.   HENT: Negative for trouble swallowing and voice change.    Eyes: Negative for visual disturbance.   Respiratory: Negative for shortness of breath.    Cardiovascular: Negative for chest pain and palpitations.   Gastrointestinal: Negative for abdominal pain and nausea.  "  Endocrine: Negative for polydipsia and polyphagia.   Genitourinary: Negative for hematuria.   Musculoskeletal: Negative for neck stiffness.   Skin: Negative for color change and pallor.   Allergic/Immunologic: Negative for immunocompromised state.   Neurological: Negative for seizures and syncope.   Hematological: Negative for adenopathy.   Psychiatric/Behavioral: Negative for sleep disturbance and suicidal ideas.       I have reviewed and confirmed the accuracy of the ROS as documented by the MA/LPN/RN Randolph Wilson MD      Objective   /90 (BP Location: Left arm, Patient Position: Sitting, Cuff Size: Adult)   Pulse 63   Temp 97.5 °F (36.4 °C) (Temporal)   Resp 18   Ht 161.3 cm (63.5\")   Wt 65.8 kg (145 lb)   SpO2 100% Comment: room air  BMI 25.28 kg/m²   BP Readings from Last 3 Encounters:   09/11/20 120/90   03/04/20 136/94   09/06/19 138/78     Wt Readings from Last 3 Encounters:   09/11/20 65.8 kg (145 lb)   03/04/20 68 kg (150 lb)   09/06/19 71 kg (156 lb 9.6 oz)     Physical Exam   Constitutional: She is oriented to person, place, and time. She appears well-developed and well-nourished.   HENT:   Head: Normocephalic.   Right Ear: Tympanic membrane, external ear and ear canal normal.   Left Ear: Tympanic membrane, external ear and ear canal normal.   Nose: Nose normal.   Eyes: Pupils are equal, round, and reactive to light. Conjunctivae, EOM and lids are normal.   Neck: No JVD present. Carotid bruit is not present. No tracheal deviation present. No thyromegaly present.   Cardiovascular: Normal rate, regular rhythm, normal heart sounds and intact distal pulses. Exam reveals no gallop and no friction rub.   No murmur heard.  Pulmonary/Chest: Effort normal and breath sounds normal. No stridor. She has no decreased breath sounds. She has no wheezes. She has no rales.   Abdominal: Soft. Bowel sounds are normal. She exhibits no distension and no mass. There is no tenderness. There is no rebound and no " guarding. No hernia.   Lymphadenopathy:        Head (right side): No submental, no submandibular, no tonsillar, no preauricular, no posterior auricular and no occipital adenopathy present.        Head (left side): No submental, no submandibular, no tonsillar, no preauricular, no posterior auricular and no occipital adenopathy present.     She has no cervical adenopathy.   Neurological: She is alert and oriented to person, place, and time. She has normal strength and normal reflexes. No cranial nerve deficit or sensory deficit. Coordination and gait normal.   Skin: Skin is warm and dry. Turgor is normal. She is not diaphoretic. No pallor.       Recent Lab Results:    Lab Results   Component Value Date     (H) 09/04/2020        Lab Results   Component Value Date    CHOL 237 (H) 08/13/2018    TRIG 170 (H) 09/04/2020    HDL 61 09/04/2020    VLDL 30 09/04/2020     LDL Cholesterol    Date Value Ref Range Status   08/16/2019 176 (H) 0 - 99 mg/dL Final   08/13/2018 151 (H) NR Final   09/30/2016 166 (H) <130 mg/dL Final     LDL Chol Calc (NIH)   Date Value Ref Range Status   09/04/2020 136 (H) 0 - 99 mg/dL Final     No results found for: PSA  Lab Results   Component Value Date    WBC 5.2 09/04/2020    HGB 14.0 09/04/2020    HCT 41.8 09/04/2020    MCV 92 09/04/2020     09/04/2020     Lab Results   Component Value Date    TSH 3.760 09/04/2020     Lab Results   Component Value Date    BUN 14 09/04/2020    CREATININE 0.68 09/04/2020    EGFRIFNONA 95 09/04/2020    EGFRIFAFRI 110 09/04/2020    BCR 21 09/04/2020    K 4.3 09/04/2020    CO2 26 09/04/2020    CALCIUM 10.0 09/04/2020    PROTENTOTREF 6.9 09/04/2020    ALBUMIN 4.9 09/04/2020    LABIL2 2.5 (H) 09/04/2020    AST 17 09/04/2020    ALT 16 09/04/2020         Age-appropriate Screening Schedule:  Refer to the list below for future screening recommendations based on patient's age, sex and/or medical conditions. Orders for these recommended tests are listed in the  plan section. The patient has been provided with a written plan.    Health Maintenance   Topic Date Due   • TDAP/TD VACCINES (1 - Tdap) 03/21/1971   • ZOSTER VACCINE (1 of 2) 03/21/2010   • PAP SMEAR  08/16/2019   • INFLUENZA VACCINE  08/01/2020   • LIPID PANEL  09/04/2021   • MAMMOGRAM  01/24/2022   • COLONOSCOPY  11/13/2025           Assessment/Plan      Medications        Problem List         LOS    Physical.  Doing well.  Vaccines updated.  She agrees to get a tetanus vaccine at [facility].  Discussed health maintenance, screening test, lifestyle modification.  Discussed calcium and vitamin D.  Followed by OB/GYN Dr. Brock, Pap/mammogram current.  Tinnitus.  Hold aspirin by 6 to 8 weeks.  Consider HEENT referral if persistent symptoms.  Remote history of barotrauma  Hyperlipidemia.  Mild elevation.  Improved currently on red yeast rice.  Discussed diet, exercise, lifestyle modification.  Recheck 1 year.  Asthma.  Good control.  Can continue Singulair/albuterol.  Colon screening.  Colonoscopy with polypectomy by 1/2015, repeat planned 2025, followed by gastroenterology.    Problem List Items Addressed This Visit        High    Mixed hyperlipidemia       Unprioritized    Annual visit for general adult medical examination with abnormal findings - Primary    Relevant Orders    POCT urinalysis dipstick, manual (Completed)    Tinnitus of both ears      Other Visit Diagnoses     Need for pneumococcal vaccination        Relevant Orders    Pneumococcal Polysaccharide Vaccine 23-Valent Greater Than or Equal To 1yo Subcutaneous / IM (Completed)              Expected course, medications, and adverse effects discussed.  Call or return if worsening or persistent symptoms.         I wore protective equipment throughout this patient encounter to include mask, gloves and eye protection. Hand hygiene was performed before donning protective equipment and after removal when leaving the room.

## 2020-10-01 DIAGNOSIS — B35.1 ONYCHOMYCOSIS: Primary | ICD-10-CM

## 2020-10-02 RX ORDER — TERBINAFINE HYDROCHLORIDE 250 MG/1
TABLET ORAL
Qty: 14 TABLET | Refills: 9 | Status: SHIPPED | OUTPATIENT
Start: 2020-10-02 | End: 2021-10-20

## 2020-10-19 DIAGNOSIS — J30.9 ALLERGIC RHINITIS, UNSPECIFIED SEASONALITY, UNSPECIFIED TRIGGER: ICD-10-CM

## 2020-10-19 RX ORDER — MONTELUKAST SODIUM 10 MG/1
TABLET ORAL
Qty: 90 TABLET | Refills: 4 | Status: SHIPPED | OUTPATIENT
Start: 2020-10-19 | End: 2021-01-05

## 2021-01-05 ENCOUNTER — OFFICE VISIT (OUTPATIENT)
Dept: FAMILY MEDICINE CLINIC | Facility: CLINIC | Age: 61
End: 2021-01-05

## 2021-01-05 VITALS
BODY MASS INDEX: 25.3 KG/M2 | HEART RATE: 77 BPM | OXYGEN SATURATION: 98 % | RESPIRATION RATE: 18 BRPM | WEIGHT: 148.2 LBS | HEIGHT: 64 IN | SYSTOLIC BLOOD PRESSURE: 159 MMHG | TEMPERATURE: 97.3 F | DIASTOLIC BLOOD PRESSURE: 87 MMHG

## 2021-01-05 DIAGNOSIS — E66.3 OVERWEIGHT WITH BODY MASS INDEX (BMI) OF 25 TO 25.9 IN ADULT: ICD-10-CM

## 2021-01-05 DIAGNOSIS — J06.9 VIRAL UPPER RESPIRATORY TRACT INFECTION: ICD-10-CM

## 2021-01-05 DIAGNOSIS — R05.9 COUGH: Primary | ICD-10-CM

## 2021-01-05 DIAGNOSIS — R06.02 SHORT OF BREATH ON EXERTION: ICD-10-CM

## 2021-01-05 PROBLEM — H93.13 TINNITUS OF BOTH EARS: Status: RESOLVED | Noted: 2020-09-11 | Resolved: 2021-01-05

## 2021-01-05 PROCEDURE — 99214 OFFICE O/P EST MOD 30 MIN: CPT | Performed by: FAMILY MEDICINE

## 2021-01-05 RX ORDER — AZITHROMYCIN 250 MG/1
TABLET, FILM COATED ORAL
Qty: 6 TABLET | Refills: 0 | Status: SHIPPED | OUTPATIENT
Start: 2021-01-05 | End: 2021-09-17

## 2021-01-05 RX ORDER — PREDNISONE 1 MG/1
5 TABLET ORAL DAILY
Qty: 45 TABLET | Refills: 0 | Status: SHIPPED | OUTPATIENT
Start: 2021-01-05 | End: 2021-09-17

## 2021-01-05 RX ORDER — MONTELUKAST SODIUM 10 MG/1
TABLET ORAL
COMMUNITY
Start: 2020-10-19 | End: 2021-10-25

## 2021-01-05 NOTE — PROGRESS NOTES
Subjective   Asia Medellin is a 60 y.o. female.     Chief Complaint   Patient presents with   • URI   • Cough       URI   This is a new problem. The current episode started in the past 7 days. The problem has been unchanged. Associated symptoms include congestion and headaches. Pertinent negatives include no abdominal pain, chest pain, diarrhea, ear pain, joint pain, nausea, rhinorrhea or sore throat. She has tried decongestant for the symptoms.            I personally reviewed and updated the patient's allergies, medications, problem list, and past medical, surgical, social, and family history. I have reviewed and confirmed the accuracy of the History of Present Illness and Review of Symptoms as documented by the MA/LPN/RN. Randolph Wilson MD    Family History   Problem Relation Age of Onset   • Diabetes Mother         Mellitus   • Hypertension Mother    • Osteoporosis Mother    • Skin cancer Father    • Leukemia Father    • Diabetes Sister         Mellitus   • Hypertension Sister    • Osteoporosis Sister    • Diabetes Maternal Grandmother         Mellitus   • Colon cancer Maternal Grandfather    • Prostate cancer Paternal Grandfather    • Alzheimer's disease Paternal Grandmother        Social History     Tobacco Use   • Smoking status: Never Smoker   • Smokeless tobacco: Never Used   Substance Use Topics   • Alcohol use: Yes     Frequency: Monthly or less     Drinks per session: 1 or 2     Binge frequency: Never     Comment: Occasional   • Drug use: No       Past Surgical History:   Procedure Laterality Date   • FACIAL LACERATIONS REPAIR  1977       Patient Active Problem List   Diagnosis   • Acute bilateral thoracic back pain   • Acute right-sided low back pain with right-sided sciatica   • Annual visit for general adult medical examination with abnormal findings   • Arthritis   • OA (osteoarthritis)   • Asthma   • Colon cancer screening   • Foot pain   • Mixed hyperlipidemia   • IBS (irritable bowel syndrome)   •  Lesion of palate   • Overweight with body mass index (BMI) of 25 to 25.9 in adult   • Short of breath on exertion   • Onychomycosis   • Viral upper respiratory tract infection   • Cough         Current Outpatient Medications:   •  albuterol sulfate HFA (PROAIR HFA) 108 (90 Base) MCG/ACT inhaler, Inhale 2 puffs Every 4 (Four) Hours., Disp: , Rfl:   •  Cholecalciferol (VITAMIN D) 25 MCG (1000 UT) tablet, Take 1 tablet by mouth Daily., Disp: , Rfl:   •  cyclobenzaprine (FLEXERIL) 10 MG tablet, TAKE 1/2-1 TABLET BY MOUTH IN THE EVENING AS NEEDED FOR MUSCLE SPASMS, Disp: 30 tablet, Rfl: 2  •  doxycycline (MONODOX) 100 MG capsule, Take 1 capsule by mouth 2 (Two) Times a Day., Disp: 20 capsule, Rfl: 0  •  meloxicam (MOBIC) 15 MG tablet, TAKE 1 TABLET BY MOUTH EVERY DAY, Disp: 90 tablet, Rfl: 4  •  montelukast (SINGULAIR) 10 MG tablet, TAKE 1 TABLET BY MOUTH EVERY DAY, Disp: , Rfl:   •  promethazine (PHENERGAN) 25 MG tablet, Take 0.5-1 tablets by mouth As Needed for Nausea., Disp: , Rfl:   •  Red Yeast Rice 600 MG capsule, Take 1 capsule by mouth Daily., Disp: , Rfl:   •  terbinafine (lamiSIL) 250 MG tablet, TAKE 1 TABLET BY MOUTH EVERY DAY *TAKE THE MEDICATION THE FIRST WEEK, THEN OFF FOR 3 WEEKS THEN REPEAT CYCLE, Disp: 14 tablet, Rfl: 9  •  azithromycin (ZITHROMAX) 250 MG tablet, Take 2 tablets the first day, then 1 tablet daily for 4 days., Disp: 6 tablet, Rfl: 0  •  predniSONE (DELTASONE) 5 MG tablet, Take 1 tablet by mouth Daily. 40mg x 3 days, 20mg x 3 days, 10mg x 3 days, 5mg x 3 days, Disp: 45 tablet, Rfl: 0         Review of Systems   Constitutional: Negative for chills and diaphoresis.   HENT: Positive for congestion. Negative for ear pain, rhinorrhea and sore throat.    Eyes: Negative for visual disturbance.   Respiratory: Negative for shortness of breath.    Cardiovascular: Negative for chest pain and palpitations.   Gastrointestinal: Negative for abdominal pain, diarrhea and nausea.   Endocrine: Negative for  "polydipsia and polyphagia.   Musculoskeletal: Negative for joint pain and neck stiffness.   Skin: Negative for color change and pallor.   Neurological: Negative for seizures and syncope.   Hematological: Negative for adenopathy.       I have reviewed and confirmed the accuracy of the ROS as documented by the MA/LPN/RN Randolph Wilson MD      Objective   /87 (BP Location: Right arm, Patient Position: Sitting)   Pulse 77   Temp 97.3 °F (36.3 °C)   Resp 18   Ht 161.3 cm (63.5\")   Wt 67.2 kg (148 lb 3.2 oz)   SpO2 98%   BMI 25.84 kg/m²   BP Readings from Last 3 Encounters:   01/05/21 159/87   09/11/20 120/90   03/04/20 136/94     Wt Readings from Last 3 Encounters:   01/05/21 67.2 kg (148 lb 3.2 oz)   09/11/20 65.8 kg (145 lb)   03/04/20 68 kg (150 lb)     Physical Exam  Constitutional:       Appearance: Normal appearance. She is well-developed. She is not diaphoretic.   HENT:      Head: Normocephalic.      Right Ear: Hearing, ear canal and external ear normal. A middle ear effusion is present. Tympanic membrane is erythematous.      Left Ear: Hearing, ear canal and external ear normal. A middle ear effusion is present. Tympanic membrane is erythematous.      Nose: Congestion present.      Right Sinus: Maxillary sinus tenderness and frontal sinus tenderness present.      Left Sinus: Maxillary sinus tenderness and frontal sinus tenderness present.      Mouth/Throat:      Pharynx: Posterior oropharyngeal erythema present.      Tonsils: No tonsillar abscesses. 1+ on the right. 1+ on the left.   Eyes:      General: Lids are normal.      Conjunctiva/sclera: Conjunctivae normal.      Pupils: Pupils are equal, round, and reactive to light.   Neck:      Meningeal: Brudzinski's sign and Kernig's sign absent.   Cardiovascular:      Rate and Rhythm: Normal rate and regular rhythm.      Pulses: Normal pulses.      Heart sounds: Normal heart sounds, S1 normal and S2 normal. No murmur. No friction rub. No gallop.  "   Pulmonary:      Effort: Pulmonary effort is normal. No accessory muscle usage or respiratory distress.      Breath sounds: No stridor. Examination of the right-upper field reveals wheezing and rhonchi. Examination of the left-upper field reveals wheezing and rhonchi. Examination of the right-middle field reveals wheezing and rhonchi. Examination of the left-middle field reveals wheezing and rhonchi. Examination of the right-lower field reveals wheezing and rhonchi. Examination of the left-lower field reveals wheezing and rhonchi. Wheezing and rhonchi present. No decreased breath sounds or rales.   Abdominal:      General: Bowel sounds are normal. There is no distension.      Palpations: Abdomen is soft. There is no mass.      Tenderness: There is no abdominal tenderness.      Hernia: No hernia is present.   Skin:     General: Skin is warm and dry.      Coloration: Skin is not pale.   Neurological:      Mental Status: She is alert and oriented to person, place, and time.      Cranial Nerves: No cranial nerve deficit.      Coordination: Coordination normal.      Gait: Gait normal.         Data / Lab Results:    No results found for: HGBA1C  Lab Results   Component Value Date     (H) 09/04/2020     Lab Results   Component Value Date     (H) 09/04/2020     (H) 08/16/2019     (H) 08/13/2018     Lab Results   Component Value Date    CHOL 237 (H) 08/13/2018    CHOL 239 (H) 09/30/2016     Lab Results   Component Value Date    TRIG 170 (H) 09/04/2020    TRIG 150 (H) 08/16/2019    TRIG 140 08/13/2018     Lab Results   Component Value Date    HDL 61 09/04/2020    HDL 62 08/16/2019    HDL 60 08/13/2018     No results found for: PSA  Lab Results   Component Value Date    WBC 5.2 09/04/2020    HGB 14.0 09/04/2020    HCT 41.8 09/04/2020    MCV 92 09/04/2020     09/04/2020     Lab Results   Component Value Date    TSH 3.760 09/04/2020      Lab Results   Component Value Date    BUN 14 09/04/2020     CREATININE 0.68 09/04/2020    EGFRIFNONA 95 09/04/2020    EGFRIFAFRI 110 09/04/2020    BCR 21 09/04/2020    K 4.3 09/04/2020    CO2 26 09/04/2020    CALCIUM 10.0 09/04/2020    PROTENTOTREF 6.9 09/04/2020    ALBUMIN 4.9 09/04/2020    LABIL2 2.5 (H) 09/04/2020    AST 17 09/04/2020    ALT 16 09/04/2020     Lab Results   Component Value Date    NOA NEGATIVE 08/13/2018      Lab Results   Component Value Date    CRP 4.1 08/13/2018      No results found for: IRON, TIBC, FERRITIN   No results found for: [id number]       Assessment/Plan      Medications        Problem List         LOS    Upper respiratory infection.  DDX includes bacterial bronchitis start antibiotics.  DDX includes COVID-19, swab pending.  Quarantine.  Increase fluid intake.  Call return if fever, respiratory difficulty or worsening symptoms.   Health maintenance.  Doing well.  Vaccines updated.  She agrees to get a tetanus vaccine at [facility].  Discussed health maintenance, screening test, lifestyle modification.  Discussed calcium and vitamin D.  Followed by OB/GYN Dr. Brock, Pap/mammogram current.  Tinnitus.  Hold aspirin by 6 to 8 weeks.  Consider HEENT referral if persistent symptoms.  Remote history of barotrauma  Hyperlipidemia.  Mild elevation.  Improved currently on red yeast rice.  Discussed diet, exercise, lifestyle modification.  Recheck 1 year.  Asthma.    Flare currently.  Start prednisone.  Overall good control.  Can continue Singulair/albuterol.  Colon screening.  Colonoscopy with polypectomy by 1/2015, repeat planned 2025, followed by gastroenterology.         Diagnoses and all orders for this visit:    1. Cough (Primary)  -     COVID-19,[facility] ROUTINE, NP/OP SWAB IN TRANSPORT MEDIA OR ESWAB 72 HR TAT - Swab, Nasopharynx  -     azithromycin (ZITHROMAX) 250 MG tablet; Take 2 tablets the first day, then 1 tablet daily for 4 days.  Dispense: 6 tablet; Refill: 0  -     predniSONE (DELTASONE) 5 MG tablet; Take 1 tablet by mouth Daily. 40mg  x 3 days, 20mg x 3 days, 10mg x 3 days, 5mg x 3 days  Dispense: 45 tablet; Refill: 0    2. Viral upper respiratory tract infection  -     COVID-19,[facility] ROUTINE, NP/OP SWAB IN TRANSPORT MEDIA OR ESWAB 72 HR TAT - Swab, Nasopharynx  -     azithromycin (ZITHROMAX) 250 MG tablet; Take 2 tablets the first day, then 1 tablet daily for 4 days.  Dispense: 6 tablet; Refill: 0  -     predniSONE (DELTASONE) 5 MG tablet; Take 1 tablet by mouth Daily. 40mg x 3 days, 20mg x 3 days, 10mg x 3 days, 5mg x 3 days  Dispense: 45 tablet; Refill: 0    3. Short of breath on exertion  -     COVID-19,[facility] ROUTINE, NP/OP SWAB IN TRANSPORT MEDIA OR ESWAB 72 HR TAT - Swab, Nasopharynx  -     azithromycin (ZITHROMAX) 250 MG tablet; Take 2 tablets the first day, then 1 tablet daily for 4 days.  Dispense: 6 tablet; Refill: 0  -     predniSONE (DELTASONE) 5 MG tablet; Take 1 tablet by mouth Daily. 40mg x 3 days, 20mg x 3 days, 10mg x 3 days, 5mg x 3 days  Dispense: 45 tablet; Refill: 0    4. Overweight with body mass index (BMI) of 25 to 25.9 in adult    Other orders  -     Cancel: XR Chest PA & Lateral              Expected course, medications, and adverse effects discussed.  Call or return if worsening or persistent symptoms.  I wore protective equipment throughout this patient encounter including a mask, gloves, and eye protection.  Hand hygiene was performed before donning protective equipment and after removal when leaving the room. The complete contents of the Assessment and Plan and Data/Lab Results as documented above have been reviewed and addressed by myself with the patient today as part of an ongoing evaluation / treatment plan.  If some of the documentation has been copied from a previous note and is unchanged it indicates that this problem / plan has been assessed today but is stable from a previous visit and no changes have been recommended.

## 2021-01-07 ENCOUNTER — TELEPHONE (OUTPATIENT)
Dept: FAMILY MEDICINE CLINIC | Facility: CLINIC | Age: 61
End: 2021-01-07

## 2021-01-07 LAB — SARS-COV-2 RNA RESP QL NAA+PROBE: DETECTED

## 2021-01-08 NOTE — TELEPHONE ENCOUNTER
Called and informed patient. She states she is about the same. Patient asked if she needed to increase her vitamins?

## 2021-01-08 NOTE — TELEPHONE ENCOUNTER
Would recommend rest, drink plenty of fluids to boost her immune system, a regular multivitamin is fine, thanks

## 2021-01-08 NOTE — TELEPHONE ENCOUNTER
Yes, her Covid results are also positive, should quarantine for 14 days from the beginning of the symptoms, check and make sure she is improving, thanks

## 2021-01-20 ENCOUNTER — TELEPHONE (OUTPATIENT)
Dept: FAMILY MEDICINE CLINIC | Facility: CLINIC | Age: 61
End: 2021-01-20

## 2021-01-20 NOTE — TELEPHONE ENCOUNTER
----- Message from Randolph Wilson MD sent at 1/20/2021  8:42 AM EST -----  Your repeat COVID-19 test is negative, thanks

## 2021-04-28 DIAGNOSIS — M54.41 ACUTE RIGHT-SIDED LOW BACK PAIN WITH RIGHT-SIDED SCIATICA: ICD-10-CM

## 2021-04-28 RX ORDER — CYCLOBENZAPRINE HCL 10 MG
TABLET ORAL
Qty: 30 TABLET | Refills: 2 | Status: SHIPPED | OUTPATIENT
Start: 2021-04-28 | End: 2021-08-13

## 2021-08-13 DIAGNOSIS — M54.41 ACUTE RIGHT-SIDED LOW BACK PAIN WITH RIGHT-SIDED SCIATICA: ICD-10-CM

## 2021-08-13 RX ORDER — CYCLOBENZAPRINE HCL 10 MG
TABLET ORAL
Qty: 30 TABLET | Refills: 2 | Status: SHIPPED | OUTPATIENT
Start: 2021-08-13 | End: 2021-12-10

## 2021-08-23 ENCOUNTER — TRANSCRIBE ORDERS (OUTPATIENT)
Dept: ADMINISTRATIVE | Facility: HOSPITAL | Age: 61
End: 2021-08-23

## 2021-08-23 DIAGNOSIS — Z12.31 VISIT FOR SCREENING MAMMOGRAM: Primary | ICD-10-CM

## 2021-08-27 ENCOUNTER — HOSPITAL ENCOUNTER (OUTPATIENT)
Dept: MAMMOGRAPHY | Facility: HOSPITAL | Age: 61
Discharge: HOME | End: 2021-08-27
Admitting: OBSTETRICS & GYNECOLOGY

## 2021-08-27 DIAGNOSIS — Z12.31 VISIT FOR SCREENING MAMMOGRAM: ICD-10-CM

## 2021-08-27 PROCEDURE — 77067 SCR MAMMO BI INCL CAD: CPT

## 2021-08-27 PROCEDURE — 77063 BREAST TOMOSYNTHESIS BI: CPT

## 2021-09-15 NOTE — PROGRESS NOTES
Subjective   Asia Medellin is a 61 y.o. female.     Chief Complaint   Patient presents with   • Annual Exam   • Hyperlipidemia   • Blood Sugar Problem       The patient is here: for coordination of medical care to discuss health maintenance and disease prevention to follow up on multiple medical problems.  Last comprehensive physical was on 9/11/2020.  Previous physical was performed by  Randolph Wilson MD  Overall has: minimal activity with work/home activities, exercises 1 time per week, good appetite, feels well with minor complaints, good energy level and is sleeping well. Nutrition: balanced diet. Last tetanus shot was 10/3/2020.   Patient is doing routine self breast exams: every 4 - 6 months   Last Completed Mammogram          MAMMOGRAM (Every 2 Years) Next due on 8/27/2023 08/27/2021  Mammo Screening Digital Tomosynthesis Bilateral With CAD    01/24/2020  Mammo Screening Digital Tomosynthesis Bilateral With CAD    07/13/2018  Mammo Screening Digital Tomosynthesis Bilateral With CAD    07/21/2017  SCANNED - MAMMO    07/21/2017  Mammo Diagnostic Right With CAD    Only the first 5 history entries have been loaded, but more history exists.               Last Completed Colonoscopy          COLORECTAL CANCER SCREENING  Next due on 11/13/2025 11/13/2015  [facility] Colonoscopy    11/13/2015  SCANNED - COLONOSCOPY                Patient reports of having elevated  blood pressure readings for one month.     Hyperlipidemia  This is a recurrent problem. The current episode started more than 1 year ago. Recent lipid tests were reviewed and are high. Exacerbating diseases include diabetes. She has no history of obesity. Pertinent negatives include no chest pain or shortness of breath. Current antihyperlipidemic treatment includes diet change and exercise. Risk factors for coronary artery disease include post-menopausal and dyslipidemia.   Blood Sugar Problem  This is a recurrent problem. The current episode started more  than 1 year ago. The problem has been gradually worsening. Pertinent negatives include no abdominal pain, chest pain, chills, diaphoresis or nausea.        Recent Hospitalizations:  No hospitalization(s) within the last year..  ccc      I personally reviewed and updated the patient's allergies, medications, problem list, and past medical, surgical, social, and family history. I have reviewed and confirmed the accuracy of the HPI and ROS as documented by the MA/LPN/RN Randolph Wilson MD    Family History   Problem Relation Age of Onset   • Diabetes Mother         Mellitus   • Hypertension Mother    • Osteoporosis Mother    • Skin cancer Father    • Leukemia Father    • Diabetes Sister         Mellitus   • Hypertension Sister    • Osteoporosis Sister    • Diabetes Maternal Grandmother         Mellitus   • Colon cancer Maternal Grandfather    • Prostate cancer Paternal Grandfather    • Alzheimer's disease Paternal Grandmother        Social History     Tobacco Use   • Smoking status: Never Smoker   • Smokeless tobacco: Never Used   Substance Use Topics   • Alcohol use: Yes     Comment: Occasional   • Drug use: No       Past Surgical History:   Procedure Laterality Date   • FACIAL LACERATIONS REPAIR  1977       Patient Active Problem List   Diagnosis   • Acute bilateral thoracic back pain   • Acute right-sided low back pain with right-sided sciatica   • Annual visit for general adult medical examination with abnormal findings   • Arthritis   • OA (osteoarthritis)   • Asthma   • Colon cancer screening   • Foot pain   • Mixed hyperlipidemia   • IBS (irritable bowel syndrome)   • Lesion of palate   • Overweight with body mass index (BMI) of 25 to 25.9 in adult   • Onychomycosis   • Screening for malignant neoplasm of cervix   • Encounter for screening mammogram for malignant neoplasm of breast   • Elevated fasting blood sugar         Current Outpatient Medications:   •  albuterol sulfate HFA (PROAIR HFA) 108 (90 Base)  MCG/ACT inhaler, Inhale 2 puffs Every 4 (Four) Hours., Disp: , Rfl:   •  Cholecalciferol (VITAMIN D) 25 MCG (1000 UT) tablet, Take 1 tablet by mouth Daily., Disp: , Rfl:   •  cyclobenzaprine (FLEXERIL) 10 MG tablet, TAKE 1/2 TO 1 TABLET BY MOUTH IN THE EVENING AS NEEDED FOR MUSCLE SPASMS, Disp: 30 tablet, Rfl: 2  •  meloxicam (MOBIC) 15 MG tablet, TAKE 1 TABLET BY MOUTH EVERY DAY, Disp: 90 tablet, Rfl: 4  •  montelukast (SINGULAIR) 10 MG tablet, TAKE 1 TABLET BY MOUTH EVERY DAY, Disp: , Rfl:   •  promethazine (PHENERGAN) 25 MG tablet, Take 0.5-1 tablets by mouth As Needed for Nausea., Disp: , Rfl:   •  Red Yeast Rice 600 MG capsule, Take 1 capsule by mouth Daily., Disp: , Rfl:   •  terbinafine (lamiSIL) 250 MG tablet, TAKE 1 TABLET BY MOUTH EVERY DAY *TAKE THE MEDICATION THE FIRST WEEK, THEN OFF FOR 3 WEEKS THEN REPEAT CYCLE, Disp: 14 tablet, Rfl: 9  •  doxycycline (MONODOX) 100 MG capsule, Take 1 capsule by mouth 2 (Two) Times a Day., Disp: 20 capsule, Rfl: 0  •  predniSONE (DELTASONE) 5 MG tablet, 40mg x 3 days, 20mg x 3 days, 10mg x 3 days, 5mg x 3 days, Disp: 45 tablet, Rfl: 0    Review of Systems   Constitutional: Negative for chills and diaphoresis.   HENT: Negative for trouble swallowing and voice change.    Eyes: Negative for visual disturbance.   Respiratory: Negative for shortness of breath.    Cardiovascular: Negative for chest pain and palpitations.   Gastrointestinal: Negative for abdominal pain and nausea.   Endocrine: Negative for polydipsia and polyphagia.   Genitourinary: Negative for hematuria.   Musculoskeletal: Negative for neck stiffness.   Skin: Negative for color change and pallor.   Allergic/Immunologic: Negative for immunocompromised state.   Neurological: Negative for seizures and syncope.   Hematological: Negative for adenopathy.   Psychiatric/Behavioral: Negative for hallucinations, sleep disturbance and suicidal ideas.       I have reviewed and confirmed the accuracy of the ROS as  "documented by the MA/LPN/RN Randolph Wilson MD      Objective   /82 (BP Location: Left arm, Patient Position: Sitting, Cuff Size: Adult)   Pulse 85   Temp 97.7 °F (36.5 °C) (Temporal)   Resp 18   Ht 160 cm (63\")   Wt 68 kg (150 lb)   SpO2 98% Comment: room air  BMI 26.57 kg/m²   BP Readings from Last 3 Encounters:   09/17/21 122/82   01/05/21 159/87   09/11/20 120/90     Wt Readings from Last 3 Encounters:   09/17/21 68 kg (150 lb)   01/05/21 67.2 kg (148 lb 3.2 oz)   09/11/20 65.8 kg (145 lb)     Physical Exam  Constitutional:       Appearance: She is well-developed. She is not diaphoretic.   HENT:      Head: Normocephalic.      Right Ear: Tympanic membrane, ear canal and external ear normal.      Left Ear: Tympanic membrane, ear canal and external ear normal.      Nose: Nose normal.   Eyes:      General: Lids are normal.      Conjunctiva/sclera: Conjunctivae normal.      Pupils: Pupils are equal, round, and reactive to light.   Neck:      Thyroid: No thyromegaly.      Vascular: No carotid bruit or JVD.      Trachea: No tracheal deviation.   Cardiovascular:      Rate and Rhythm: Normal rate and regular rhythm.      Heart sounds: Normal heart sounds. No murmur heard.   No friction rub. No gallop.    Pulmonary:      Effort: Pulmonary effort is normal.      Breath sounds: Normal breath sounds. No stridor. No decreased breath sounds, wheezing or rales.   Abdominal:      General: Bowel sounds are normal. There is no distension.      Palpations: Abdomen is soft. There is no mass.      Tenderness: There is no abdominal tenderness. There is no guarding or rebound.      Hernia: No hernia is present.   Lymphadenopathy:      Head:      Right side of head: No submental, submandibular, tonsillar, preauricular, posterior auricular or occipital adenopathy.      Left side of head: No submental, submandibular, tonsillar, preauricular, posterior auricular or occipital adenopathy.      Cervical: No cervical adenopathy. "   Skin:     General: Skin is warm and dry.      Coloration: Skin is not pale.   Neurological:      Mental Status: She is alert and oriented to person, place, and time.      Cranial Nerves: No cranial nerve deficit.      Sensory: No sensory deficit.      Coordination: Coordination normal.      Gait: Gait normal.      Deep Tendon Reflexes: Reflexes are normal and symmetric.         Data / Lab Results:    Hemoglobin A1C   Date Value Ref Range Status   09/17/2021 6.6 % Final     Lab Results   Component Value Date     (H) 09/11/2021     Lab Results   Component Value Date     (H) 09/11/2021     (H) 09/04/2020     (H) 08/16/2019     Lab Results   Component Value Date    CHOL 237 (H) 08/13/2018    CHOL 239 (H) 09/30/2016     Lab Results   Component Value Date    TRIG 123 09/11/2021    TRIG 170 (H) 09/04/2020    TRIG 150 (H) 08/16/2019     Lab Results   Component Value Date    HDL 68 09/11/2021    HDL 61 09/04/2020    HDL 62 08/16/2019     No results found for: PSA  Lab Results   Component Value Date    WBC 6.0 09/11/2021    HGB 14.3 09/11/2021    HCT 43.9 09/11/2021    MCV 93 09/11/2021     09/11/2021     Lab Results   Component Value Date    TSH 2.840 09/11/2021      Lab Results   Component Value Date    BUN 13 09/11/2021    CREATININE 0.72 09/11/2021    EGFRIFNONA 91 09/11/2021    EGFRIFAFRI 105 09/11/2021    BCR 18 09/11/2021    K 4.4 09/11/2021    CO2 24 09/11/2021    CALCIUM 10.1 09/11/2021    PROTENTOTREF 7.3 09/11/2021    ALBUMIN 4.9 (H) 09/11/2021    LABIL2 2.0 09/11/2021    AST 17 09/11/2021    ALT 18 09/11/2021     Lab Results   Component Value Date    NOA NEGATIVE 08/13/2018      Lab Results   Component Value Date    CRP 4.1 08/13/2018      No results found for: IRON, TIBC, FERRITIN   No results found for: [id number]     Age-appropriate Screening Schedule:  Refer to the list below for future screening recommendations based on patient's age, sex and/or medical conditions. Orders  for these recommended tests are listed in the plan section. The patient has been provided with a written plan.    Health Maintenance   Topic Date Due   • ZOSTER VACCINE (1 of 2) Never done   • PAP SMEAR  Never done   • INFLUENZA VACCINE  10/01/2021   • LIPID PANEL  09/11/2022   • MAMMOGRAM  08/27/2023   • TDAP/TD VACCINES (2 - Td or Tdap) 10/03/2030           Assessment/Plan      Medications        Problem List         LOS      Physical.  Doing well.  Vaccines current.  Discussed health maintenance, screening test, lifestyle modification.  Discussed calcium and vitamin D.  Followed by OB/GYN Dr. Brock, Pap/mammogram current.  Tinnitus.  Mild, refractory to holding aspirin, restart.  Consider HEENT referral if worsening symptoms.  Remote history of barotrauma  Hyperlipidemia.  Mild elevation.  Improved currently on red yeast rice.  Discussed diet, exercise, lifestyle modification.  Recheck 1 year.  Asthma.  Overall good control.  Can continue Singulair/albuterol.  Colon screening.  Colonoscopy with polypectomy by 1/2015, repeat planned 2025, followed by gastroenterology.  COVID-19 viral respiratory infection.  Improved/resolved, has completed quarantine.  Hip Pain.  Benign exam today, likely secondary to bursitis.  Start prednisone.  Ice, NSAIDs, rotation exercise discussed.  Call return persistent symptoms.    COVID-19 vaccine status: Vaccinated.    Diagnoses and all orders for this visit:    1. Annual visit for general adult medical examination with abnormal findings (Primary)    2. Mixed hyperlipidemia    3. Elevated fasting blood sugar  -     POCT urinalysis dipstick, manual  -     POC Glycosylated Hemoglobin (Hb A1C)  -     POCT Glucose    4. Overweight with body mass index (BMI) of 25 to 25.9 in adult    5. Colon cancer screening    6. Encounter for screening mammogram for malignant neoplasm of breast    7. Screening for malignant neoplasm of cervix    8. Post menopausal syndrome  -     Cancel: DEXA Bone  Density Axial; Future    9. Screening for osteoporosis  -     Cancel: DEXA Bone Density Axial; Future    10. Hip pain  -     predniSONE (DELTASONE) 5 MG tablet; 40mg x 3 days, 20mg x 3 days, 10mg x 3 days, 5mg x 3 days  Dispense: 45 tablet; Refill: 0              Expected course, medications, and adverse effects discussed.  Call or return if worsening or persistent symptoms.  I wore protective equipment throughout this patient encounter including a mask, gloves, and eye protection.  Hand hygiene was performed before donning protective equipment and after removal when leaving the room. The complete contents of the Assessment and Plan and Data / Lab Results as documented above have been reviewed and addressed by myself with the patient today as part of an ongoing evaluation / treatment plan.  If some of the documentation has been copied from a previous note and is unchanged it indicates that this problem / plan has been assessed today but is stable from a previous visit and no changes have been recommended.

## 2021-09-17 ENCOUNTER — OFFICE VISIT (OUTPATIENT)
Dept: FAMILY MEDICINE CLINIC | Facility: CLINIC | Age: 61
End: 2021-09-17

## 2021-09-17 VITALS
WEIGHT: 150 LBS | BODY MASS INDEX: 26.58 KG/M2 | TEMPERATURE: 97.7 F | HEIGHT: 63 IN | RESPIRATION RATE: 18 BRPM | OXYGEN SATURATION: 98 % | SYSTOLIC BLOOD PRESSURE: 122 MMHG | HEART RATE: 85 BPM | DIASTOLIC BLOOD PRESSURE: 82 MMHG

## 2021-09-17 DIAGNOSIS — Z12.4 SCREENING FOR MALIGNANT NEOPLASM OF CERVIX: ICD-10-CM

## 2021-09-17 DIAGNOSIS — M25.559 HIP PAIN: ICD-10-CM

## 2021-09-17 DIAGNOSIS — Z12.11 COLON CANCER SCREENING: ICD-10-CM

## 2021-09-17 DIAGNOSIS — Z12.31 ENCOUNTER FOR SCREENING MAMMOGRAM FOR MALIGNANT NEOPLASM OF BREAST: ICD-10-CM

## 2021-09-17 DIAGNOSIS — Z13.820 SCREENING FOR OSTEOPOROSIS: ICD-10-CM

## 2021-09-17 DIAGNOSIS — E78.2 MIXED HYPERLIPIDEMIA: ICD-10-CM

## 2021-09-17 DIAGNOSIS — E66.3 OVERWEIGHT WITH BODY MASS INDEX (BMI) OF 25 TO 25.9 IN ADULT: ICD-10-CM

## 2021-09-17 DIAGNOSIS — Z00.01 ANNUAL VISIT FOR GENERAL ADULT MEDICAL EXAMINATION WITH ABNORMAL FINDINGS: Primary | ICD-10-CM

## 2021-09-17 DIAGNOSIS — N95.1 POST MENOPAUSAL SYNDROME: ICD-10-CM

## 2021-09-17 DIAGNOSIS — R73.01 ELEVATED FASTING BLOOD SUGAR: ICD-10-CM

## 2021-09-17 PROBLEM — R05.9 COUGH: Status: RESOLVED | Noted: 2021-01-05 | Resolved: 2021-09-17

## 2021-09-17 PROBLEM — R06.02 SHORT OF BREATH ON EXERTION: Status: RESOLVED | Noted: 2019-09-06 | Resolved: 2021-09-17

## 2021-09-17 PROBLEM — J06.9 VIRAL UPPER RESPIRATORY TRACT INFECTION: Status: RESOLVED | Noted: 2021-01-05 | Resolved: 2021-09-17

## 2021-09-17 LAB
BILIRUB BLD-MCNC: NEGATIVE MG/DL
CLARITY, POC: CLEAR
COLOR UR: YELLOW
GLUCOSE BLDC GLUCOMTR-MCNC: 109 MG/DL (ref 70–130)
GLUCOSE UR STRIP-MCNC: NEGATIVE MG/DL
HBA1C MFR BLD: 6.6 %
KETONES UR QL: NEGATIVE
LEUKOCYTE EST, POC: NEGATIVE
NITRITE UR-MCNC: NEGATIVE MG/ML
PH UR: 7 [PH] (ref 5–8)
PROT UR STRIP-MCNC: NEGATIVE MG/DL
RBC # UR STRIP: NEGATIVE /UL
SP GR UR: 1.01 (ref 1–1.03)
UROBILINOGEN UR QL: NORMAL

## 2021-09-17 PROCEDURE — 81002 URINALYSIS NONAUTO W/O SCOPE: CPT | Performed by: FAMILY MEDICINE

## 2021-09-17 PROCEDURE — 99213 OFFICE O/P EST LOW 20 MIN: CPT | Performed by: FAMILY MEDICINE

## 2021-09-17 PROCEDURE — 99396 PREV VISIT EST AGE 40-64: CPT | Performed by: FAMILY MEDICINE

## 2021-09-17 RX ORDER — PREDNISONE 1 MG/1
TABLET ORAL
Qty: 45 TABLET | Refills: 0 | Status: SHIPPED | OUTPATIENT
Start: 2021-09-17 | End: 2022-09-23

## 2021-10-20 DIAGNOSIS — B35.1 ONYCHOMYCOSIS: ICD-10-CM

## 2021-10-20 RX ORDER — TERBINAFINE HYDROCHLORIDE 250 MG/1
TABLET ORAL
Qty: 14 TABLET | Refills: 9 | Status: SHIPPED | OUTPATIENT
Start: 2021-10-20 | End: 2022-09-22

## 2021-10-24 DIAGNOSIS — J30.9 ALLERGIC RHINITIS, UNSPECIFIED: ICD-10-CM

## 2021-10-25 RX ORDER — MONTELUKAST SODIUM 10 MG/1
TABLET ORAL
Qty: 90 TABLET | Refills: 4 | Status: SHIPPED | OUTPATIENT
Start: 2021-10-25 | End: 2023-01-23

## 2021-12-10 ENCOUNTER — OFFICE VISIT (OUTPATIENT)
Dept: FAMILY MEDICINE CLINIC | Facility: CLINIC | Age: 61
End: 2021-12-10

## 2021-12-10 VITALS
SYSTOLIC BLOOD PRESSURE: 162 MMHG | RESPIRATION RATE: 18 BRPM | BODY MASS INDEX: 26.72 KG/M2 | OXYGEN SATURATION: 98 % | DIASTOLIC BLOOD PRESSURE: 88 MMHG | HEIGHT: 63 IN | HEART RATE: 82 BPM | TEMPERATURE: 96.9 F | WEIGHT: 150.8 LBS

## 2021-12-10 DIAGNOSIS — J06.9 UPPER RESPIRATORY TRACT INFECTION, UNSPECIFIED TYPE: Primary | ICD-10-CM

## 2021-12-10 DIAGNOSIS — M54.41 ACUTE RIGHT-SIDED LOW BACK PAIN WITH RIGHT-SIDED SCIATICA: ICD-10-CM

## 2021-12-10 DIAGNOSIS — E66.3 OVERWEIGHT WITH BODY MASS INDEX (BMI) OF 25 TO 25.9 IN ADULT: ICD-10-CM

## 2021-12-10 DIAGNOSIS — J45.40 MODERATE PERSISTENT ASTHMA, UNSPECIFIED WHETHER COMPLICATED: ICD-10-CM

## 2021-12-10 PROCEDURE — 99214 OFFICE O/P EST MOD 30 MIN: CPT | Performed by: FAMILY MEDICINE

## 2021-12-10 RX ORDER — CYCLOBENZAPRINE HCL 10 MG
TABLET ORAL
Qty: 30 TABLET | Refills: 2 | Status: SHIPPED | OUTPATIENT
Start: 2021-12-10 | End: 2022-04-22

## 2021-12-10 RX ORDER — PREDNISONE 1 MG/1
TABLET ORAL
Qty: 45 TABLET | Refills: 0 | Status: SHIPPED | OUTPATIENT
Start: 2021-12-10 | End: 2022-09-23

## 2021-12-10 RX ORDER — CEPHALEXIN 500 MG/1
500 CAPSULE ORAL 3 TIMES DAILY
Qty: 30 CAPSULE | Refills: 0 | Status: SHIPPED | OUTPATIENT
Start: 2021-12-10 | End: 2022-09-23

## 2021-12-10 NOTE — PROGRESS NOTES
Subjective   Asia Medellin is a 62 y.o. female.     Chief Complaint   Patient presents with   • URI       URI   This is a new problem. The current episode started 1 to 4 weeks ago. There has been no fever. Associated symptoms include congestion, coughing, nausea, rhinorrhea, sinus pain, sneezing and a sore throat. Pertinent negatives include no abdominal pain, chest pain, ear pain, headaches, plugged ear sensation or vomiting. She has tried decongestant for the symptoms. The treatment provided mild relief.            I personally reviewed and updated the patient's allergies, medications, problem list, and past medical, surgical, social, and family history. I have reviewed and confirmed the accuracy of the History of Present Illness and Review of Symptoms as documented by the MA/LPN/RN. Randolph Wilson MD    Family History   Problem Relation Age of Onset   • Diabetes Mother         Mellitus   • Hypertension Mother    • Osteoporosis Mother    • Skin cancer Father    • Leukemia Father    • Diabetes Sister         Mellitus   • Hypertension Sister    • Osteoporosis Sister    • Diabetes Maternal Grandmother         Mellitus   • Colon cancer Maternal Grandfather    • Prostate cancer Paternal Grandfather    • Alzheimer's disease Paternal Grandmother        Social History     Tobacco Use   • Smoking status: Never Smoker   • Smokeless tobacco: Never Used   Substance Use Topics   • Alcohol use: Yes     Comment: Occasional   • Drug use: No       Past Surgical History:   Procedure Laterality Date   • FACIAL LACERATIONS REPAIR  1977       Patient Active Problem List   Diagnosis   • Acute bilateral thoracic back pain   • Acute right-sided low back pain with right-sided sciatica   • Annual visit for general adult medical examination with abnormal findings   • Arthritis   • OA (osteoarthritis)   • Asthma   • Colon cancer screening   • Foot pain   • Mixed hyperlipidemia   • IBS (irritable bowel syndrome)   • Lesion of palate   •  Overweight with body mass index (BMI) of 26 to 26.9 in adult   • Onychomycosis   • Screening for malignant neoplasm of cervix   • Encounter for screening mammogram for malignant neoplasm of breast   • Elevated fasting blood sugar         Current Outpatient Medications:   •  albuterol sulfate HFA (PROAIR HFA) 108 (90 Base) MCG/ACT inhaler, Inhale 2 puffs Every 4 (Four) Hours., Disp: , Rfl:   •  Cholecalciferol (VITAMIN D) 25 MCG (1000 UT) tablet, Take 1 tablet by mouth Daily., Disp: , Rfl:   •  meloxicam (MOBIC) 15 MG tablet, TAKE 1 TABLET BY MOUTH EVERY DAY, Disp: 90 tablet, Rfl: 4  •  montelukast (SINGULAIR) 10 MG tablet, TAKE 1 TABLET BY MOUTH EVERY DAY, Disp: 90 tablet, Rfl: 4  •  Red Yeast Rice 600 MG capsule, Take 1 capsule by mouth Daily., Disp: , Rfl:   •  terbinafine (lamiSIL) 250 MG tablet, TAKE 1 TABLET BY MOUTH EVERY DAY *TAKE THE MEDICATION THE FIRST WEEK, THEN OFF FOR 3 WEEKS THEN REPEAT CYCLE, Disp: 14 tablet, Rfl: 9  •  cephalexin (KEFLEX) 500 MG capsule, Take 1 capsule by mouth 3 (Three) Times a Day., Disp: 30 capsule, Rfl: 0  •  cyclobenzaprine (FLEXERIL) 10 MG tablet, TAKE 1/2 TO 1 TABLET BY MOUTH IN THE EVENING AS NEEDED FOR MUSCLE SPASMS, Disp: 30 tablet, Rfl: 2  •  doxycycline (MONODOX) 100 MG capsule, Take 1 capsule by mouth 2 (Two) Times a Day., Disp: 20 capsule, Rfl: 0  •  HYDROcodone-homatropine (Hydromet) 5-1.5 MG/5ML syrup, Take 5 mL nightly as needed, Disp: 180 mL, Rfl: 0  •  predniSONE (DELTASONE) 5 MG tablet, 40mg x 3 days, 20mg x 3 days, 10mg x 3 days, 5mg x 3 days, Disp: 45 tablet, Rfl: 0  •  predniSONE (DELTASONE) 5 MG tablet, 40mg x 3 days, 20mg x 3 days, 10mg x 3 days, 5mg x 3 days, Disp: 45 tablet, Rfl: 0  •  promethazine (PHENERGAN) 25 MG tablet, Take 0.5-1 tablets by mouth As Needed for Nausea., Disp: , Rfl:          Review of Systems   Constitutional: Negative for chills and diaphoresis.   HENT: Positive for congestion, rhinorrhea, sneezing and sore throat. Negative for ear  "pain.    Eyes: Negative for visual disturbance.   Respiratory: Positive for cough. Negative for shortness of breath.    Cardiovascular: Negative for chest pain and palpitations.   Gastrointestinal: Positive for nausea. Negative for abdominal pain and vomiting.   Endocrine: Negative for polydipsia and polyphagia.   Musculoskeletal: Negative for neck stiffness.   Skin: Negative for color change and pallor.   Neurological: Negative for seizures and syncope.   Hematological: Negative for adenopathy.       I have reviewed and confirmed the accuracy of the ROS as documented by the MA/LPN/RN Randolph Wilson MD      Objective   /88   Pulse 82   Temp 96.9 °F (36.1 °C)   Resp 18   Ht 160 cm (63\")   Wt 68.4 kg (150 lb 12.8 oz)   SpO2 98%   BMI 26.71 kg/m²   BP Readings from Last 3 Encounters:   12/10/21 162/88   09/17/21 122/82   01/05/21 159/87     Wt Readings from Last 3 Encounters:   12/10/21 68.4 kg (150 lb 12.8 oz)   09/17/21 68 kg (150 lb)   01/05/21 67.2 kg (148 lb 3.2 oz)     Physical Exam  Constitutional:       Appearance: Normal appearance. She is well-developed. She is not diaphoretic.   HENT:      Head: Normocephalic.      Right Ear: Hearing, ear canal and external ear normal. A middle ear effusion is present. Tympanic membrane is erythematous.      Left Ear: Hearing, ear canal and external ear normal. A middle ear effusion is present. Tympanic membrane is erythematous.      Nose: Congestion present.      Right Sinus: Maxillary sinus tenderness and frontal sinus tenderness present.      Left Sinus: Maxillary sinus tenderness and frontal sinus tenderness present.      Mouth/Throat:      Pharynx: Posterior oropharyngeal erythema present.      Tonsils: No tonsillar abscesses. 1+ on the right. 1+ on the left.   Eyes:      General: Lids are normal.      Conjunctiva/sclera: Conjunctivae normal.      Pupils: Pupils are equal, round, and reactive to light.   Neck:      Meningeal: Brudzinski's sign and Kernig's " sign absent.   Cardiovascular:      Rate and Rhythm: Normal rate and regular rhythm.      Pulses: Normal pulses.      Heart sounds: Normal heart sounds, S1 normal and S2 normal. No murmur heard.  No friction rub. No gallop.    Pulmonary:      Effort: Pulmonary effort is normal. No accessory muscle usage or respiratory distress.      Breath sounds: No stridor. Examination of the right-upper field reveals wheezing and rhonchi. Examination of the left-upper field reveals wheezing and rhonchi. Examination of the right-middle field reveals wheezing and rhonchi. Examination of the left-middle field reveals wheezing and rhonchi. Examination of the right-lower field reveals wheezing and rhonchi. Examination of the left-lower field reveals wheezing and rhonchi. Wheezing and rhonchi present. No decreased breath sounds or rales.   Abdominal:      General: Bowel sounds are normal. There is no distension.      Palpations: Abdomen is soft. There is no mass.      Tenderness: There is no abdominal tenderness.      Hernia: No hernia is present.   Skin:     General: Skin is warm and dry.      Coloration: Skin is not pale.   Neurological:      Mental Status: She is alert and oriented to person, place, and time.      Cranial Nerves: No cranial nerve deficit.      Coordination: Coordination normal.      Gait: Gait normal.         Data / Lab Results:    Hemoglobin A1C   Date Value Ref Range Status   09/17/2021 6.6 % Final        Lab Results   Component Value Date     (H) 09/16/2022     (H) 09/11/2021     (H) 09/04/2020     Lab Results   Component Value Date    CHOL 237 (H) 08/13/2018    CHOL 239 (H) 09/30/2016     Lab Results   Component Value Date    TRIG 107 09/16/2022    TRIG 123 09/11/2021    TRIG 170 (H) 09/04/2020     Lab Results   Component Value Date    HDL 64 09/16/2022    HDL 68 09/11/2021    HDL 61 09/04/2020     No results found for: PSA  Lab Results   Component Value Date    WBC 5.2 09/16/2022    HGB  14.1 09/16/2022    HCT 41.9 09/16/2022    MCV 92 09/16/2022     09/16/2022     Lab Results   Component Value Date    TSH 3.250 09/16/2022      Lab Results   Component Value Date    GLUCOSE 105 (H) 09/16/2022    BUN 13 09/16/2022    CREATININE 0.75 09/16/2022    EGFRIFNONA 91 09/11/2021    EGFRIFAFRI 105 09/11/2021    BCR 17 09/16/2022    K 4.3 09/16/2022    CO2 26 09/16/2022    CALCIUM 9.7 09/16/2022    PROTENTOTREF 6.7 09/16/2022    ALBUMIN 4.9 (H) 09/16/2022    LABIL2 2.7 (H) 09/16/2022    AST 16 09/16/2022    ALT 18 09/16/2022     Lab Results   Component Value Date    NOA NEGATIVE 08/13/2018      Lab Results   Component Value Date    CRP 4.1 08/13/2018      No results found for: IRON, TIBC, FERRITIN   No results found for: [id number]       Assessment & Plan      Medications        Problem List         LOS    Acute bacterial bronchitis.  Start antibiotics.  Increase fluid intake.  Covid testing negative per work.  Call return if fever, respiratory difficulty, worsening symptoms.  Physical.  Doing well.  Vaccines current.  Discussed health maintenance, screening test, lifestyle modification.  Discussed calcium and vitamin D.  Followed by OB/GYN Dr. Brock, Pap/mammogram current.  Tinnitus.  Mild, refractory to holding aspirin, restart.  Consider HEENT referral if worsening symptoms.  Remote history of barotrauma  Hyperlipidemia.  Mild elevation.  Improved currently on red yeast rice.  Discussed diet, exercise, lifestyle modification.  Recheck 1 year.  Asthma.  Overall good control.  Can continue Singulair/albuterol.  Colon screening.  Colonoscopy with polypectomy by 1/2015, repeat planned 2025, followed by gastroenterology.  COVID-19 viral respiratory infection.  Improved/resolved, has completed quarantine.  Hip Pain.  Benign exam today, likely secondary to bursitis.  Start prednisone.  Ice, NSAIDs, rotation exercise discussed.  Call return persistent symptoms.    COVID-19 vaccine status:  Vaccinated.           Diagnoses and all orders for this visit:    1. Upper respiratory tract infection, unspecified type (Primary)  -     predniSONE (DELTASONE) 5 MG tablet; 40mg x 3 days, 20mg x 3 days, 10mg x 3 days, 5mg x 3 days  Dispense: 45 tablet; Refill: 0  -     cephalexin (KEFLEX) 500 MG capsule; Take 1 capsule by mouth 3 (Three) Times a Day.  Dispense: 30 capsule; Refill: 0  -     HYDROcodone-homatropine (Hydromet) 5-1.5 MG/5ML syrup; Take 5 mL nightly as needed  Dispense: 180 mL; Refill: 0    2. Overweight with body mass index (BMI) of 25 to 25.9 in adult  Assessment & Plan:  Patient's (Body mass index is 26.71 kg/m².) indicates that they are overweight with health conditions that include dyslipidemias and osteoarthritis . Weight is unchanged. BMI is is above average; BMI management plan is completed. We discussed portion control and increasing exercise.       3. Moderate persistent asthma, unspecified whether complicated            Expected course, medications, and adverse effects discussed.  Call or return if worsening or persistent symptoms.  I wore protective equipment throughout this patient encounter including a mask, gloves, and eye protection.  Hand hygiene was performed before donning protective equipment and after removal when leaving the room. The complete contents of the Assessment and Plan and Data/Lab Results as documented above have been reviewed and addressed by myself with the patient today as part of an ongoing evaluation / treatment plan.  If some of the documentation has been copied from a previous note and is unchanged it indicates that this problem / plan has been assessed today but is stable from a previous visit and no changes have been recommended.

## 2022-04-21 DIAGNOSIS — M54.41 ACUTE RIGHT-SIDED LOW BACK PAIN WITH RIGHT-SIDED SCIATICA: ICD-10-CM

## 2022-04-22 RX ORDER — CYCLOBENZAPRINE HCL 10 MG
TABLET ORAL
Qty: 30 TABLET | Refills: 2 | Status: SHIPPED | OUTPATIENT
Start: 2022-04-22 | End: 2022-07-22

## 2022-07-21 DIAGNOSIS — M54.41 ACUTE RIGHT-SIDED LOW BACK PAIN WITH RIGHT-SIDED SCIATICA: ICD-10-CM

## 2022-07-22 RX ORDER — CYCLOBENZAPRINE HCL 10 MG
TABLET ORAL
Qty: 30 TABLET | Refills: 2 | Status: SHIPPED | OUTPATIENT
Start: 2022-07-22 | End: 2022-10-24

## 2022-09-20 NOTE — PROGRESS NOTES
Subjective   Asia Medellin is a 62 y.o. female.     Chief Complaint   Patient presents with   • Annual Exam   • elevated fasting blood sugar   • Hyperlipidemia       The patient is here: to discuss health maintenance and disease prevention.  Last comprehensive physical was on 9/17/21.  Previous physical was performed by  Randolph Wilson MD  Overall has: moderate activity with work/home activities, exercises 2 - 3 times per week, good appetite, feels well with no complaints, good energy level, is sleeping well and returned to full activity. Nutrition: appropriate balanced diet. Last tetanus shot was less than 5 years ago.   Patient is doing routine self breast exams: occasionally   Last Completed Mammogram          MAMMOGRAM (Every 2 Years) Next due on 8/27/2023 08/27/2021  Mammo Screening Digital Tomosynthesis Bilateral With CAD    01/24/2020  Mammo Screening Digital Tomosynthesis Bilateral With CAD    07/13/2018  Mammo Screening Digital Tomosynthesis Bilateral With CAD    07/21/2017  SCANNED - MAMMO    07/21/2017  Mammo Diagnostic Right With CAD    Only the first 5 history entries have been loaded, but more history exists.               Last Completed Colonoscopy          COLORECTAL CANCER SCREENING (COLONOSCOPY - Every 10 Years) Next due on 11/13/2025 11/13/2015  HM Colonoscopy component of HM COLONOSCOPY    11/13/2015  SCANNED - COLONOSCOPY                History of Present Illness     Recent Hospitalizations:  No hospitalization(s) within the last year..  ccc      I personally reviewed and updated the patient's allergies, medications, problem list, and past medical, surgical, social, and family history. I have reviewed and confirmed the accuracy of the HPI and ROS as documented by the MA/LPN/RN Randolph Wilson MD    Family History   Problem Relation Age of Onset   • Diabetes Mother         Mellitus   • Hypertension Mother    • Osteoporosis Mother    • Skin cancer Father    • Leukemia Father    • Diabetes  Sister         Mellitus   • Hypertension Sister    • Osteoporosis Sister    • Diabetes Maternal Grandmother         Mellitus   • Colon cancer Maternal Grandfather    • Prostate cancer Paternal Grandfather    • Alzheimer's disease Paternal Grandmother        Social History     Tobacco Use   • Smoking status: Never Smoker   • Smokeless tobacco: Never Used   Vaping Use   • Vaping Use: Never used   Substance Use Topics   • Alcohol use: Yes     Comment: Occasional   • Drug use: No       Past Surgical History:   Procedure Laterality Date   • FACIAL LACERATIONS REPAIR  1977       Patient Active Problem List   Diagnosis   • Acute bilateral thoracic back pain   • Acute right-sided low back pain with right-sided sciatica   • Annual visit for general adult medical examination with abnormal findings   • Arthritis   • OA (osteoarthritis)   • Asthma   • Colon cancer screening   • Foot pain   • Mixed hyperlipidemia   • IBS (irritable bowel syndrome)   • Lesion of palate   • Overweight with body mass index (BMI) of 27 to 27.9 in adult   • Onychomycosis   • Screening for malignant neoplasm of cervix   • Encounter for screening mammogram for malignant neoplasm of breast   • Elevated fasting blood sugar   • Other constipation         Current Outpatient Medications:   •  albuterol sulfate  (90 Base) MCG/ACT inhaler, Inhale 2 puffs Every 4 (Four) Hours., Disp: , Rfl:   •  Cholecalciferol (VITAMIN D) 25 MCG (1000 UT) tablet, Take 1 tablet by mouth Daily., Disp: , Rfl:   •  Cinnamon 500 MG capsule, Take 500 mg by mouth Daily., Disp: , Rfl:   •  cyclobenzaprine (FLEXERIL) 10 MG tablet, TAKE 1/2 TO 1 TABLET BY MOUTH IN THE EVENING AS NEEDED FOR MUSCLE SPASMS, Disp: 30 tablet, Rfl: 2  •  meloxicam (MOBIC) 15 MG tablet, TAKE 1 TABLET BY MOUTH EVERY DAY, Disp: 90 tablet, Rfl: 4  •  montelukast (SINGULAIR) 10 MG tablet, TAKE 1 TABLET BY MOUTH EVERY DAY, Disp: 90 tablet, Rfl: 4  •  Red Yeast Rice 600 MG capsule, Take 1 capsule by mouth  "Daily., Disp: , Rfl:   •  terbinafine (lamiSIL) 250 MG tablet, TAKE 1 TABLET BY MOUTH EVERY DAY *TAKE THE MEDICATION THE FIRST WEEK, THEN OFF FOR 3 WEEKS THEN REPEAT CYCLE, Disp: 14 tablet, Rfl: 9  •  lubiprostone (Amitiza) 24 MCG capsule, Take 1 capsule by mouth Daily With Breakfast., Disp: 90 capsule, Rfl: 3  •  simvastatin (ZOCOR) 5 MG tablet, Take 1 tablet by mouth Every Night., Disp: 90 tablet, Rfl: 3    Review of Systems   Constitutional: Negative for chills and diaphoresis.   HENT: Negative for trouble swallowing and voice change.    Eyes: Negative for visual disturbance.   Respiratory: Negative for shortness of breath.    Cardiovascular: Negative for chest pain and palpitations.   Gastrointestinal: Negative for abdominal pain and nausea.   Endocrine: Negative for polydipsia and polyphagia.   Genitourinary: Negative for hematuria.   Musculoskeletal: Negative for neck stiffness.   Skin: Negative for color change and pallor.   Allergic/Immunologic: Negative for immunocompromised state.   Neurological: Negative for seizures and syncope.   Hematological: Negative for adenopathy.   Psychiatric/Behavioral: Negative for hallucinations, sleep disturbance and suicidal ideas.       I have reviewed and confirmed the accuracy of the ROS as documented by the MA/LPN/RN Randolph Wilson MD      Objective   /80 (BP Location: Left arm, Patient Position: Sitting, Cuff Size: Adult)   Pulse 70   Temp 98 °F (36.7 °C)   Resp 16   Ht 160 cm (63\")   Wt 69.3 kg (152 lb 12.8 oz)   SpO2 100%   Breastfeeding No   BMI 27.07 kg/m²   BP Readings from Last 3 Encounters:   09/23/22 138/80   12/10/21 162/88   09/17/21 122/82     Wt Readings from Last 3 Encounters:   09/23/22 69.3 kg (152 lb 12.8 oz)   12/10/21 68.4 kg (150 lb 12.8 oz)   09/17/21 68 kg (150 lb)     Physical Exam  Constitutional:       Appearance: She is well-developed. She is not diaphoretic.   HENT:      Head: Normocephalic.      Right Ear: Tympanic membrane, ear " canal and external ear normal.      Left Ear: Tympanic membrane, ear canal and external ear normal.      Nose: Nose normal.   Eyes:      General: Lids are normal.      Conjunctiva/sclera: Conjunctivae normal.      Pupils: Pupils are equal, round, and reactive to light.   Neck:      Thyroid: No thyromegaly.      Vascular: No carotid bruit or JVD.      Trachea: No tracheal deviation.   Cardiovascular:      Rate and Rhythm: Normal rate and regular rhythm.      Heart sounds: Normal heart sounds. No murmur heard.    No friction rub. No gallop.   Pulmonary:      Effort: Pulmonary effort is normal.      Breath sounds: Normal breath sounds. No stridor. No decreased breath sounds, wheezing or rales.   Abdominal:      General: Bowel sounds are normal. There is no distension.      Palpations: Abdomen is soft. There is no mass.      Tenderness: There is no abdominal tenderness. There is no guarding or rebound.      Hernia: No hernia is present.   Lymphadenopathy:      Head:      Right side of head: No submental, submandibular, tonsillar, preauricular, posterior auricular or occipital adenopathy.      Left side of head: No submental, submandibular, tonsillar, preauricular, posterior auricular or occipital adenopathy.      Cervical: No cervical adenopathy.   Skin:     General: Skin is warm and dry.      Coloration: Skin is not pale.   Neurological:      Mental Status: She is alert and oriented to person, place, and time.      Cranial Nerves: No cranial nerve deficit.      Sensory: No sensory deficit.      Coordination: Coordination normal.      Gait: Gait normal.      Deep Tendon Reflexes: Reflexes are normal and symmetric.         Data / Lab Results:    Hemoglobin A1C   Date Value Ref Range Status   09/23/2022 6.3 % Final   09/17/2021 6.6 % Final        Lab Results   Component Value Date     (H) 09/16/2022     (H) 09/11/2021     (H) 09/04/2020     Lab Results   Component Value Date    CHOL 237 (H) 08/13/2018     CHOL 239 (H) 09/30/2016     Lab Results   Component Value Date    TRIG 107 09/16/2022    TRIG 123 09/11/2021    TRIG 170 (H) 09/04/2020     Lab Results   Component Value Date    HDL 64 09/16/2022    HDL 68 09/11/2021    HDL 61 09/04/2020     No results found for: PSA  Lab Results   Component Value Date    WBC 5.2 09/16/2022    HGB 14.1 09/16/2022    HCT 41.9 09/16/2022    MCV 92 09/16/2022     09/16/2022     Lab Results   Component Value Date    TSH 3.250 09/16/2022      Lab Results   Component Value Date    GLUCOSE 105 (H) 09/16/2022    BUN 13 09/16/2022    CREATININE 0.75 09/16/2022    EGFRIFNONA 91 09/11/2021    EGFRIFAFRI 105 09/11/2021    BCR 17 09/16/2022    K 4.3 09/16/2022    CO2 26 09/16/2022    CALCIUM 9.7 09/16/2022    PROTENTOTREF 6.7 09/16/2022    ALBUMIN 4.9 (H) 09/16/2022    LABIL2 2.7 (H) 09/16/2022    AST 16 09/16/2022    ALT 18 09/16/2022     Lab Results   Component Value Date    NOA NEGATIVE 08/13/2018      Lab Results   Component Value Date    CRP 4.1 08/13/2018      No results found for: IRON, TIBC, FERRITIN   No results found for: EXKCYSZC49     Age-appropriate Screening Schedule:  Refer to the list below for future screening recommendations based on patient's age, sex and/or medical conditions. Orders for these recommended tests are listed in the plan section. The patient has been provided with a written plan.    Health Maintenance   Topic Date Due   • ZOSTER VACCINE (1 of 2) Never done   • PAP SMEAR  Never done   • INFLUENZA VACCINE  10/01/2022   • MAMMOGRAM  08/27/2023   • LIPID PANEL  09/16/2023   • TDAP/TD VACCINES (2 - Td or Tdap) 10/03/2030           Assessment & Plan      Medications        Problem List         LOS    Physical.  Doing well.  Vaccines current.  Discussed health maintenance, screening test, lifestyle modification.  Discussed calcium and vitamin D.  Followed by OB/GYN Dr. Brock, Pap/mammogram current.  Tinnitus.  Mild, refractory to holding aspirin, restart.   Consider HEENT referral if worsening symptoms.  Remote history of barotrauma  Hyperlipidemia.  Persistent moderate elevation, refractory to red yeast rice, add Crestor.  .  Discussed diet, exercise, lifestyle modification.  Recheck 1 year.  Asthma.  Overall good control.  Can continue Singulair/albuterol.  Consider daily Reinoehl steroid dose and symptoms  Colon screening.  Colonoscopy with polypectomy by 1/2015, repeat planned 2025, followed by gastroenterology.  COVID-19 viral respiratory infection.  Improved/resolved, has completed quarantine.  Hip Pain.  Benign exam today, likely secondary to bursitis.  Start prednisone.  Ice, NSAIDs, rotation exercise discussed.  Call return persistent symptoms.    COVID-19 vaccine status: Vaccinated.  Constipation.  Refractory to Metamucil/MiraLAX, add Amitiza.  Colonoscopy benign 2015.  Consider gastroenterology eval if persistent symptoms.  Dysuria.  Some hesitancy/frequency.  Has upcoming exam per Dr. Bass.  Start Kegels.  Consider Detrol.    Diagnoses and all orders for this visit:    1. Annual visit for general adult medical examination with abnormal findings (Primary)  -     POCT urinalysis dipstick, manual    2. Colon cancer screening    3. Screening for malignant neoplasm of cervix    4. Encounter for screening mammogram for malignant neoplasm of breast    5. Overweight with body mass index (BMI) of 27 to 27.9 in adult  Assessment & Plan:  Patient's (Body mass index is 27.07 kg/m².) indicates that they are overweight with health conditions that include none . Weight is unchanged. BMI is is above average; BMI management plan is completed. We discussed portion control and increasing exercise.       6. Elevated fasting blood sugar  -     POC Glycosylated Hemoglobin (Hb A1C)  -     POCT Glucose    7. Mixed hyperlipidemia  -     simvastatin (ZOCOR) 5 MG tablet; Take 1 tablet by mouth Every Night.  Dispense: 90 tablet; Refill: 3    8. Urinary incontinence,  unspecified type  -     lubiprostone (Amitiza) 24 MCG capsule; Take 1 capsule by mouth Daily With Breakfast.  Dispense: 90 capsule; Refill: 3    9. Other constipation    10. Irritable bowel syndrome with constipation    11. Moderate persistent asthma, unspecified whether complicated            Expected course, medications, and adverse effects discussed.  Call or return if worsening or persistent symptoms.  I wore protective equipment throughout this patient encounter including a mask, gloves, and eye protection.  Hand hygiene was performed before donning protective equipment and after removal when leaving the room. The complete contents of the Assessment and Plan and Data / Lab Results as documented above have been reviewed and addressed by myself with the patient today as part of an ongoing evaluation / treatment plan.  If some of the documentation has been copied from a previous note and is unchanged it indicates that this problem / plan has been assessed today but is stable from a previous visit and no changes have been recommended.The separate E/M service provided today is significant, medically necessary, and separately identifiable.

## 2022-09-22 DIAGNOSIS — B35.1 ONYCHOMYCOSIS: ICD-10-CM

## 2022-09-22 RX ORDER — TERBINAFINE HYDROCHLORIDE 250 MG/1
TABLET ORAL
Qty: 14 TABLET | Refills: 9 | Status: SHIPPED | OUTPATIENT
Start: 2022-09-22

## 2022-09-23 ENCOUNTER — OFFICE VISIT (OUTPATIENT)
Dept: FAMILY MEDICINE CLINIC | Facility: CLINIC | Age: 62
End: 2022-09-23

## 2022-09-23 VITALS
SYSTOLIC BLOOD PRESSURE: 138 MMHG | RESPIRATION RATE: 16 BRPM | BODY MASS INDEX: 27.07 KG/M2 | HEIGHT: 63 IN | WEIGHT: 152.8 LBS | TEMPERATURE: 98 F | OXYGEN SATURATION: 100 % | HEART RATE: 70 BPM | DIASTOLIC BLOOD PRESSURE: 80 MMHG

## 2022-09-23 DIAGNOSIS — Z12.4 SCREENING FOR MALIGNANT NEOPLASM OF CERVIX: ICD-10-CM

## 2022-09-23 DIAGNOSIS — Z12.11 COLON CANCER SCREENING: ICD-10-CM

## 2022-09-23 DIAGNOSIS — E66.3 OVERWEIGHT WITH BODY MASS INDEX (BMI) OF 27 TO 27.9 IN ADULT: ICD-10-CM

## 2022-09-23 DIAGNOSIS — K59.09 OTHER CONSTIPATION: ICD-10-CM

## 2022-09-23 DIAGNOSIS — K58.1 IRRITABLE BOWEL SYNDROME WITH CONSTIPATION: ICD-10-CM

## 2022-09-23 DIAGNOSIS — R73.01 ELEVATED FASTING BLOOD SUGAR: ICD-10-CM

## 2022-09-23 DIAGNOSIS — Z00.01 ANNUAL VISIT FOR GENERAL ADULT MEDICAL EXAMINATION WITH ABNORMAL FINDINGS: Primary | ICD-10-CM

## 2022-09-23 DIAGNOSIS — E78.2 MIXED HYPERLIPIDEMIA: ICD-10-CM

## 2022-09-23 DIAGNOSIS — J45.40 MODERATE PERSISTENT ASTHMA, UNSPECIFIED WHETHER COMPLICATED: ICD-10-CM

## 2022-09-23 DIAGNOSIS — Z12.31 ENCOUNTER FOR SCREENING MAMMOGRAM FOR MALIGNANT NEOPLASM OF BREAST: ICD-10-CM

## 2022-09-23 DIAGNOSIS — R32 URINARY INCONTINENCE, UNSPECIFIED TYPE: ICD-10-CM

## 2022-09-23 LAB
BILIRUB BLD-MCNC: NEGATIVE MG/DL
CLARITY, POC: CLEAR
COLOR UR: YELLOW
EXPIRATION DATE: NORMAL
GLUCOSE BLDC GLUCOMTR-MCNC: 117 MG/DL (ref 70–130)
GLUCOSE UR STRIP-MCNC: NEGATIVE MG/DL
HBA1C MFR BLD: 6.3 %
KETONES UR QL: NEGATIVE
LEUKOCYTE EST, POC: NEGATIVE
Lab: NORMAL
NITRITE UR-MCNC: NEGATIVE MG/ML
PH UR: 7 [PH] (ref 5–8)
PROT UR STRIP-MCNC: NEGATIVE MG/DL
RBC # UR STRIP: NEGATIVE /UL
SP GR UR: 1 (ref 1–1.03)
UROBILINOGEN UR QL: NORMAL

## 2022-09-23 PROCEDURE — 99213 OFFICE O/P EST LOW 20 MIN: CPT | Performed by: FAMILY MEDICINE

## 2022-09-23 PROCEDURE — 99396 PREV VISIT EST AGE 40-64: CPT | Performed by: FAMILY MEDICINE

## 2022-09-23 PROCEDURE — 83036 HEMOGLOBIN GLYCOSYLATED A1C: CPT | Performed by: FAMILY MEDICINE

## 2022-09-23 PROCEDURE — 82962 GLUCOSE BLOOD TEST: CPT | Performed by: FAMILY MEDICINE

## 2022-09-23 PROCEDURE — 81002 URINALYSIS NONAUTO W/O SCOPE: CPT | Performed by: FAMILY MEDICINE

## 2022-09-23 RX ORDER — SIMVASTATIN 5 MG
5 TABLET ORAL NIGHTLY
Qty: 90 TABLET | Refills: 3 | Status: SHIPPED | OUTPATIENT
Start: 2022-09-23

## 2022-09-23 RX ORDER — AMPICILLIN TRIHYDRATE 250 MG
500 CAPSULE ORAL DAILY
COMMUNITY

## 2022-09-23 RX ORDER — LUBIPROSTONE 24 UG/1
24 CAPSULE ORAL
Qty: 90 CAPSULE | Refills: 3 | Status: SHIPPED | OUTPATIENT
Start: 2022-09-23

## 2022-09-23 NOTE — ASSESSMENT & PLAN NOTE
Patient's (Body mass index is 27.07 kg/m².) indicates that they are overweight with health conditions that include none . Weight is unchanged. BMI is is above average; BMI management plan is completed. We discussed portion control and increasing exercise.

## 2022-10-24 DIAGNOSIS — M54.41 ACUTE RIGHT-SIDED LOW BACK PAIN WITH RIGHT-SIDED SCIATICA: ICD-10-CM

## 2022-10-24 RX ORDER — CYCLOBENZAPRINE HCL 10 MG
TABLET ORAL
Qty: 30 TABLET | Refills: 2 | Status: SHIPPED | OUTPATIENT
Start: 2022-10-24 | End: 2023-01-23

## 2023-01-22 DIAGNOSIS — J30.9 ALLERGIC RHINITIS, UNSPECIFIED: ICD-10-CM

## 2023-01-22 DIAGNOSIS — M54.41 ACUTE RIGHT-SIDED LOW BACK PAIN WITH RIGHT-SIDED SCIATICA: ICD-10-CM

## 2023-01-23 RX ORDER — MONTELUKAST SODIUM 10 MG/1
TABLET ORAL
Qty: 90 TABLET | Refills: 4 | Status: SHIPPED | OUTPATIENT
Start: 2023-01-23

## 2023-01-23 RX ORDER — CYCLOBENZAPRINE HCL 10 MG
TABLET ORAL
Qty: 30 TABLET | Refills: 2 | Status: SHIPPED | OUTPATIENT
Start: 2023-01-23

## 2023-03-16 ENCOUNTER — TRANSCRIBE ORDERS (OUTPATIENT)
Dept: ADMINISTRATIVE | Facility: HOSPITAL | Age: 63
End: 2023-03-16
Payer: COMMERCIAL

## 2023-03-16 DIAGNOSIS — Z12.31 SCREENING MAMMOGRAM, ENCOUNTER FOR: Primary | ICD-10-CM

## 2023-03-24 ENCOUNTER — HOSPITAL ENCOUNTER (OUTPATIENT)
Dept: MAMMOGRAPHY | Facility: HOSPITAL | Age: 63
Discharge: HOME | End: 2023-03-24
Admitting: OBSTETRICS & GYNECOLOGY
Payer: COMMERCIAL

## 2023-03-24 DIAGNOSIS — Z12.31 SCREENING MAMMOGRAM, ENCOUNTER FOR: ICD-10-CM

## 2023-03-24 PROCEDURE — 77067 SCR MAMMO BI INCL CAD: CPT

## 2023-03-24 PROCEDURE — 77063 BREAST TOMOSYNTHESIS BI: CPT

## 2023-04-29 DIAGNOSIS — M54.41 ACUTE RIGHT-SIDED LOW BACK PAIN WITH RIGHT-SIDED SCIATICA: ICD-10-CM

## 2023-05-01 RX ORDER — CYCLOBENZAPRINE HCL 10 MG
TABLET ORAL
Qty: 30 TABLET | Refills: 2 | Status: SHIPPED | OUTPATIENT
Start: 2023-05-01

## 2023-08-01 DIAGNOSIS — M54.41 ACUTE RIGHT-SIDED LOW BACK PAIN WITH RIGHT-SIDED SCIATICA: ICD-10-CM

## 2023-08-02 RX ORDER — CYCLOBENZAPRINE HCL 10 MG
TABLET ORAL
Qty: 30 TABLET | Refills: 2 | Status: SHIPPED | OUTPATIENT
Start: 2023-08-02

## 2023-09-07 ENCOUNTER — TELEPHONE (OUTPATIENT)
Dept: FAMILY MEDICINE CLINIC | Facility: CLINIC | Age: 63
End: 2023-09-07
Payer: COMMERCIAL

## 2023-09-07 NOTE — TELEPHONE ENCOUNTER
Florencio came by to  a copy of Asia's lab orders for 09/23/2023 ([facility]).  The orders have not been entered yet. Could we please enter them and leave a copy up front  For Florencio to  next week. Thank you!

## 2023-09-08 DIAGNOSIS — E78.2 MIXED HYPERLIPIDEMIA: Primary | ICD-10-CM

## 2023-09-08 DIAGNOSIS — R53.81 MALAISE AND FATIGUE: ICD-10-CM

## 2023-09-08 DIAGNOSIS — R53.83 MALAISE AND FATIGUE: ICD-10-CM

## 2023-09-20 DIAGNOSIS — E78.2 MIXED HYPERLIPIDEMIA: ICD-10-CM

## 2023-09-20 RX ORDER — SIMVASTATIN 5 MG
TABLET ORAL
Qty: 90 TABLET | Refills: 3 | Status: SHIPPED | OUTPATIENT
Start: 2023-09-20

## 2023-09-23 LAB
ALBUMIN SERPL-MCNC: 5.1 G/DL (ref 3.9–4.9)
ALBUMIN/GLOB SERPL: 2.6 {RATIO} (ref 1.2–2.2)
ALP SERPL-CCNC: 76 IU/L (ref 44–121)
ALT SERPL-CCNC: 40 IU/L (ref 0–32)
AST SERPL-CCNC: 28 IU/L (ref 0–40)
BASOPHILS # BLD AUTO: 0 X10E3/UL (ref 0–0.2)
BASOPHILS NFR BLD AUTO: 0 %
BILIRUB SERPL-MCNC: 0.3 MG/DL (ref 0–1.2)
BUN SERPL-MCNC: 16 MG/DL (ref 8–27)
BUN/CREAT SERPL: 23 (ref 12–28)
CALCIUM SERPL-MCNC: 10.1 MG/DL (ref 8.7–10.3)
CHLORIDE SERPL-SCNC: 101 MMOL/L (ref 96–106)
CHOLEST SERPL-MCNC: 225 MG/DL (ref 100–199)
CHOLEST/HDLC SERPL: 4.1 RATIO (ref 0–4.4)
CO2 SERPL-SCNC: 27 MMOL/L (ref 20–29)
CREAT SERPL-MCNC: 0.71 MG/DL (ref 0.57–1)
EGFRCR SERPLBLD CKD-EPI 2021: 95 ML/MIN/1.73
EOSINOPHIL # BLD AUTO: 0.1 X10E3/UL (ref 0–0.4)
EOSINOPHIL NFR BLD AUTO: 2 %
ERYTHROCYTE [DISTWIDTH] IN BLOOD BY AUTOMATED COUNT: 13.3 % (ref 11.7–15.4)
GLOBULIN SER CALC-MCNC: 2 G/DL (ref 1.5–4.5)
GLUCOSE SERPL-MCNC: 123 MG/DL (ref 70–99)
HCT VFR BLD AUTO: 43.1 % (ref 34–46.6)
HDLC SERPL-MCNC: 55 MG/DL
HGB BLD-MCNC: 14.2 G/DL (ref 11.1–15.9)
IMM GRANULOCYTES # BLD AUTO: 0 X10E3/UL (ref 0–0.1)
IMM GRANULOCYTES NFR BLD AUTO: 0 %
LDLC SERPL CALC-MCNC: 141 MG/DL (ref 0–99)
LYMPHOCYTES # BLD AUTO: 2.6 X10E3/UL (ref 0.7–3.1)
LYMPHOCYTES NFR BLD AUTO: 46 %
MCH RBC QN AUTO: 30.8 PG (ref 26.6–33)
MCHC RBC AUTO-ENTMCNC: 32.9 G/DL (ref 31.5–35.7)
MCV RBC AUTO: 94 FL (ref 79–97)
MONOCYTES # BLD AUTO: 0.4 X10E3/UL (ref 0.1–0.9)
MONOCYTES NFR BLD AUTO: 7 %
NEUTROPHILS # BLD AUTO: 2.5 X10E3/UL (ref 1.4–7)
NEUTROPHILS NFR BLD AUTO: 45 %
PLATELET # BLD AUTO: 214 X10E3/UL (ref 150–450)
POTASSIUM SERPL-SCNC: 4.8 MMOL/L (ref 3.5–5.2)
PROT SERPL-MCNC: 7.1 G/DL (ref 6–8.5)
RBC # BLD AUTO: 4.61 X10E6/UL (ref 3.77–5.28)
SODIUM SERPL-SCNC: 144 MMOL/L (ref 134–144)
TRIGL SERPL-MCNC: 164 MG/DL (ref 0–149)
TSH SERPL DL<=0.005 MIU/L-ACNC: 2.36 UIU/ML (ref 0.45–4.5)
VLDLC SERPL CALC-MCNC: 29 MG/DL (ref 5–40)
WBC # BLD AUTO: 5.6 X10E3/UL (ref 3.4–10.8)

## 2023-09-26 NOTE — PROGRESS NOTES
Lois Barrios is a 63 y.o. female.     Chief Complaint   Patient presents with    Annual Exam    Hyperlipidemia       The patient is here: to discuss health maintenance and disease prevention.  Last comprehensive physical was on 9/23/2022.  Previous physical was performed by  Randolph Wilson MD  Overall has: moderate activity with work/home activities, good appetite, feels well with no complaints, good energy level, is sleeping well, and returned to full activity. Nutrition: appropriate balanced diet, balanced diet, and eating a variety of foods. Last tetanus shot was  10/3/2020 . Patient is doing routine self breast exams: occasionally     Last Completed Mammogram            MAMMOGRAM (Every 2 Years) Next due on 3/24/2025      03/24/2023  Mammo Screening Digital Tomosynthesis Bilateral With CAD    08/27/2021  Mammo Screening Digital Tomosynthesis Bilateral With CAD    01/24/2020  Mammo Screening Digital Tomosynthesis Bilateral With CAD    07/13/2018  Mammo Screening Digital Tomosynthesis Bilateral With CAD    07/21/2017  SCANNED - MAMMO    Only the first 5 history entries have been loaded, but more history exists.                   Last Completed Colonoscopy            COLORECTAL CANCER SCREENING (COLONOSCOPY - Every 10 Years) Next due on 11/13/2025 11/13/2015  HM Colonoscopy component of HM COLONOSCOPY    11/13/2015  SCANNED - COLONOSCOPY                    Hyperlipidemia  This is a chronic problem. The current episode started more than 1 year ago. Recent lipid tests were reviewed and are high. There are no known factors aggravating her hyperlipidemia. Pertinent negatives include no chest pain or shortness of breath. Current antihyperlipidemic treatment includes herbal therapy and statins. There are no compliance problems.  Risk factors for coronary artery disease include dyslipidemia.      Recent Hospitalizations:  No hospitalization(s) within the last year..  ccc      I personally reviewed and  updated the patient's allergies, medications, problem list, and past medical, surgical, social, and family history. I have reviewed and confirmed the accuracy of the HPI and ROS as documented by the MA/LPN/RN Randolph Wilson MD    Family History   Problem Relation Age of Onset    Diabetes Mother         Mellitus    Hypertension Mother     Osteoporosis Mother     Skin cancer Father     Leukemia Father     Diabetes Sister         Mellitus    Hypertension Sister     Osteoporosis Sister     Colon cancer Maternal Grandmother     Diabetes Maternal Grandmother         Mellitus    Alzheimer's disease Paternal Grandmother     Colon cancer Maternal Grandfather     Prostate cancer Paternal Grandfather        Social History     Tobacco Use    Smoking status: Never    Smokeless tobacco: Never   Vaping Use    Vaping Use: Never used   Substance Use Topics    Alcohol use: Yes     Comment: Occasional    Drug use: No       Past Surgical History:   Procedure Laterality Date    FACIAL LACERATIONS REPAIR  1977       Patient Active Problem List   Diagnosis    Acute bilateral thoracic back pain    Acute right-sided low back pain with right-sided sciatica    Annual visit for general adult medical examination with abnormal findings    Arthritis    OA (osteoarthritis)    Asthma    Colon cancer screening    Foot pain    Mixed hyperlipidemia    IBS (irritable bowel syndrome)    Lesion of palate    Overweight with body mass index (BMI) of 27 to 27.9 in adult    Onychomycosis    Screening for malignant neoplasm of cervix    Encounter for screening mammogram for malignant neoplasm of breast    Elevated fasting blood sugar    Other constipation         Current Outpatient Medications:     albuterol sulfate  (90 Base) MCG/ACT inhaler, Inhale 2 puffs Every 4 (Four) Hours., Disp: , Rfl:     Cholecalciferol (VITAMIN D) 25 MCG (1000 UT) tablet, Take 1 tablet by mouth Daily., Disp: , Rfl:     Cinnamon 500 MG capsule, Take 1 capsule by mouth Daily.,  "Disp: , Rfl:     cyclobenzaprine (FLEXERIL) 10 MG tablet, TAKE 1/2 TO 1 TABLET BY MOUTH IN THE EVENING AS NEEDED FOR MUSCLE SPASMS, Disp: 30 tablet, Rfl: 2    meloxicam (MOBIC) 15 MG tablet, Take 1 tablet by mouth Daily., Disp: 90 tablet, Rfl: 3    montelukast (SINGULAIR) 10 MG tablet, TAKE 1 TABLET BY MOUTH EVERY DAY, Disp: 90 tablet, Rfl: 4    simvastatin (ZOCOR) 20 MG tablet, Take 1 tablet by mouth every night at bedtime., Disp: 90 tablet, Rfl: 3    aspirin 81 MG EC tablet, Take 1 tablet by mouth Daily., Disp: , Rfl:     Review of Systems   Constitutional:  Negative for chills and diaphoresis.   HENT:  Negative for trouble swallowing and voice change.    Eyes:  Negative for visual disturbance.   Respiratory:  Negative for shortness of breath.    Cardiovascular:  Negative for chest pain and palpitations.   Gastrointestinal:  Negative for abdominal pain and nausea.   Endocrine: Negative for polydipsia and polyphagia.   Genitourinary:  Negative for hematuria.   Musculoskeletal:  Negative for neck stiffness.   Skin:  Negative for color change and pallor.   Allergic/Immunologic: Negative for immunocompromised state.   Neurological:  Negative for seizures and syncope.   Hematological:  Negative for adenopathy.   Psychiatric/Behavioral:  Negative for hallucinations, sleep disturbance and suicidal ideas.      I have reviewed and confirmed the accuracy of the ROS as documented by the MA/LPN/RN Randolph Wilson MD      Objective   /66 (BP Location: Left arm, Patient Position: Sitting, Cuff Size: Adult)   Pulse 104   Temp 96.4 °F (35.8 °C) (Temporal)   Resp 18   Ht 160 cm (63\")   Wt 69.6 kg (153 lb 6.4 oz)   SpO2 97%   BMI 27.17 kg/m²   BP Readings from Last 3 Encounters:   09/29/23 124/66   09/23/22 138/80   12/10/21 162/88     Wt Readings from Last 3 Encounters:   09/29/23 69.6 kg (153 lb 6.4 oz)   09/23/22 69.3 kg (152 lb 12.8 oz)   12/10/21 68.4 kg (150 lb 12.8 oz)     Physical Exam  Constitutional:       " Appearance: She is well-developed. She is not diaphoretic.   HENT:      Head: Normocephalic.      Right Ear: Tympanic membrane, ear canal and external ear normal.      Left Ear: Tympanic membrane, ear canal and external ear normal.      Nose: Nose normal.   Eyes:      General: Lids are normal.      Conjunctiva/sclera: Conjunctivae normal.      Pupils: Pupils are equal, round, and reactive to light.   Neck:      Thyroid: No thyromegaly.      Vascular: No carotid bruit or JVD.      Trachea: No tracheal deviation.   Cardiovascular:      Rate and Rhythm: Normal rate and regular rhythm.      Heart sounds: Normal heart sounds. No murmur heard.    No friction rub. No gallop.   Pulmonary:      Effort: Pulmonary effort is normal.      Breath sounds: Normal breath sounds. No stridor. No decreased breath sounds, wheezing or rales.   Abdominal:      General: Bowel sounds are normal. There is no distension.      Palpations: Abdomen is soft. There is no mass.      Tenderness: There is no abdominal tenderness. There is no guarding or rebound.      Hernia: No hernia is present.   Lymphadenopathy:      Head:      Right side of head: No submental, submandibular, tonsillar, preauricular, posterior auricular or occipital adenopathy.      Left side of head: No submental, submandibular, tonsillar, preauricular, posterior auricular or occipital adenopathy.      Cervical: No cervical adenopathy.   Skin:     General: Skin is warm and dry.      Coloration: Skin is not pale.   Neurological:      Mental Status: She is alert and oriented to person, place, and time.      Cranial Nerves: No cranial nerve deficit.      Sensory: No sensory deficit.      Coordination: Coordination normal.      Gait: Gait normal.      Deep Tendon Reflexes: Reflexes are normal and symmetric.       Data / Lab Results:    Hemoglobin A1C   Date Value Ref Range Status   09/23/2022 6.3 % Final   09/17/2021 6.6 % Final        Lab Results   Component Value Date      (H) 09/22/2023     (H) 09/16/2022     (H) 09/11/2021     Lab Results   Component Value Date    CHOL 237 (H) 08/13/2018    CHOL 239 (H) 09/30/2016     Lab Results   Component Value Date    TRIG 164 (H) 09/22/2023    TRIG 107 09/16/2022    TRIG 123 09/11/2021     Lab Results   Component Value Date    HDL 55 09/22/2023    HDL 64 09/16/2022    HDL 68 09/11/2021     No results found for: PSA  Lab Results   Component Value Date    WBC 5.6 09/22/2023    HGB 14.2 09/22/2023    HCT 43.1 09/22/2023    MCV 94 09/22/2023     09/22/2023     Lab Results   Component Value Date    TSH 2.360 09/22/2023     Lab Results   Component Value Date    GLUCOSE 123 (H) 09/22/2023    BUN 16 09/22/2023    CREATININE 0.71 09/22/2023    EGFRIFNONA 91 09/11/2021    EGFRIFAFRI 105 09/11/2021    BCR 23 09/22/2023    K 4.8 09/22/2023    CO2 27 09/22/2023    CALCIUM 10.1 09/22/2023    PROTENTOTREF 7.1 09/22/2023    ALBUMIN 5.1 (H) 09/22/2023    LABIL2 2.6 (H) 09/22/2023    AST 28 09/22/2023    ALT 40 (H) 09/22/2023     Lab Results   Component Value Date    NOA NEGATIVE 08/13/2018      Lab Results   Component Value Date    CRP 4.1 08/13/2018      No results found for: IRON, TIBC, FERRITIN   No results found for: AMJENFJS55     Age-appropriate Screening Schedule:  Refer to the list below for future screening recommendations based on patient's age, sex and/or medical conditions. Orders for these recommended tests are listed in the plan section. The patient has been provided with a written plan.    Health Maintenance   Topic Date Due    ZOSTER VACCINE (1 of 2) Never done    HEPATITIS C SCREENING  Never done    PAP SMEAR  Never done    INFLUENZA VACCINE  08/01/2023    COVID-19 Vaccine (3 - 2023-24 season) 09/01/2023    LIPID PANEL  09/22/2024    ANNUAL PHYSICAL  09/29/2024    BMI FOLLOWUP  09/29/2024    MAMMOGRAM  03/24/2025    COLORECTAL CANCER SCREENING  11/13/2025    TDAP/TD VACCINES (3 - Td or Tdap) 10/03/2030    Pneumococcal  Vaccine 0-64  Completed           Assessment & Plan      Medications        Problem List         LOS    Physical.  Doing well.  Vaccines current.  Discussed coated baby aspirin daily.  Discussed health maintenance, screening test, lifestyle modification.  Discussed calcium and vitamin D.  Followed by OB/GYN Dr. Brock, Pap/mammogram current.  Tinnitus.  Mild, refractory to holding aspirin, restart.  Consider HEENT referral if worsening symptoms.  Remote history of barotrauma  Hyperlipidemia.  Improved today, LDL to 141, tolerating statin, dose increased.  Discussed diet, exercise, lifestyle modification.  Recheck 1 year.  Asthma.  Overall good control.  Can continue Singulair/albuterol.  Consider daily inhaled steroids if worsening symptoms.  symptoms  Colon screening.  Colonoscopy with polypectomy by 1/2015, repeat planned 2025, followed by gastroenterology.  COVID-19 viral respiratory infection.  Improved/resolved, has completed quarantine.  Hip Pain.  Benign exam today, likely secondary to bursitis.  Start prednisone.  Ice, NSAIDs, rotation exercise discussed.  Call return persistent symptoms.    COVID-19 vaccine status: Vaccinated.  Constipation.  Refractory to Metamucil/MiraLAX, add Amitiza.  Colonoscopy benign 2015.  Consider gastroenterology eval if persistent symptoms.  Dysuria.  Some hesitancy/frequency.  Has upcoming exam per Dr. Bass.  Start Kegels.  Consider Detrol.  Family history melanoma.  Her dad.  Benign skin exam today.  Some protection discussed.  Continue to monitor.    Diagnoses and all orders for this visit:    1. Annual visit for general adult medical examination with abnormal findings (Primary)    2. Mixed hyperlipidemia  -     simvastatin (ZOCOR) 20 MG tablet; Take 1 tablet by mouth every night at bedtime.  Dispense: 90 tablet; Refill: 3  -     CBC & Differential; Future  -     Comprehensive Metabolic Panel; Future  -     Lipid Panel With / Chol / HDL Ratio; Future    3. Overweight with  body mass index (BMI) of 27 to 27.9 in adult  Assessment & Plan:  Patient's (Body mass index is 27.17 kg/m².) indicates that they are overweight with health conditions that include dyslipidemias . Weight is unchanged. BMI is is above average; BMI management plan is completed. We discussed portion control and increasing exercise.       4. Colon cancer screening    5. Encounter for screening mammogram for malignant neoplasm of breast    6. Screening for malignant neoplasm of cervix    7. Moderate persistent asthma, unspecified whether complicated    8. Chronic bilateral low back pain without sciatica  -     meloxicam (MOBIC) 15 MG tablet; Take 1 tablet by mouth Daily.  Dispense: 90 tablet; Refill: 3    9. Moderate asthma, unspecified whether complicated, unspecified whether persistent  -     Pneumococcal Conjugate Vaccine 20-Valent All    10. Need for pneumococcal 20-valent conjugate vaccination  -     Pneumococcal Conjugate Vaccine 20-Valent All    11. Malaise and fatigue  -     CBC & Differential; Future  -     Comprehensive Metabolic Panel; Future  -     TSH; Future  -     Lipid Panel With / Chol / HDL Ratio; Future    12. Primary osteoarthritis involving multiple joints              Expected course, medications, and adverse effects discussed.  Call or return if worsening or persistent symptoms.  I wore protective equipment throughout this patient encounter including a mask, gloves, and eye protection.  Hand hygiene was performed before donning protective equipment and after removal when leaving the room. The complete contents of the Assessment and Plan and Data / Lab Results as documented above have been reviewed and addressed by myself with the patient today as part of an ongoing evaluation / treatment plan.  If some of the documentation has been copied from a previous note and is unchanged it indicates that this problem / plan has been assessed today but is stable from a previous visit and no changes have been  recommended.  The separate E/M service provided today is significant, medically necessary, and separately identifiable.

## 2023-09-29 ENCOUNTER — OFFICE VISIT (OUTPATIENT)
Dept: FAMILY MEDICINE CLINIC | Facility: CLINIC | Age: 63
End: 2023-09-29
Payer: COMMERCIAL

## 2023-09-29 VITALS
RESPIRATION RATE: 18 BRPM | WEIGHT: 153.4 LBS | SYSTOLIC BLOOD PRESSURE: 124 MMHG | HEART RATE: 104 BPM | OXYGEN SATURATION: 97 % | DIASTOLIC BLOOD PRESSURE: 66 MMHG | HEIGHT: 63 IN | TEMPERATURE: 96.4 F | BODY MASS INDEX: 27.18 KG/M2

## 2023-09-29 DIAGNOSIS — G89.29 CHRONIC BILATERAL LOW BACK PAIN WITHOUT SCIATICA: ICD-10-CM

## 2023-09-29 DIAGNOSIS — E66.3 OVERWEIGHT WITH BODY MASS INDEX (BMI) OF 27 TO 27.9 IN ADULT: ICD-10-CM

## 2023-09-29 DIAGNOSIS — M15.9 PRIMARY OSTEOARTHRITIS INVOLVING MULTIPLE JOINTS: ICD-10-CM

## 2023-09-29 DIAGNOSIS — Z12.31 ENCOUNTER FOR SCREENING MAMMOGRAM FOR MALIGNANT NEOPLASM OF BREAST: ICD-10-CM

## 2023-09-29 DIAGNOSIS — M54.50 CHRONIC BILATERAL LOW BACK PAIN WITHOUT SCIATICA: ICD-10-CM

## 2023-09-29 DIAGNOSIS — J45.40 MODERATE PERSISTENT ASTHMA, UNSPECIFIED WHETHER COMPLICATED: ICD-10-CM

## 2023-09-29 DIAGNOSIS — R53.81 MALAISE AND FATIGUE: ICD-10-CM

## 2023-09-29 DIAGNOSIS — Z12.11 COLON CANCER SCREENING: ICD-10-CM

## 2023-09-29 DIAGNOSIS — Z12.4 SCREENING FOR MALIGNANT NEOPLASM OF CERVIX: ICD-10-CM

## 2023-09-29 DIAGNOSIS — Z00.01 ANNUAL VISIT FOR GENERAL ADULT MEDICAL EXAMINATION WITH ABNORMAL FINDINGS: Primary | ICD-10-CM

## 2023-09-29 DIAGNOSIS — Z23 NEED FOR PNEUMOCOCCAL 20-VALENT CONJUGATE VACCINATION: ICD-10-CM

## 2023-09-29 DIAGNOSIS — J45.909 MODERATE ASTHMA, UNSPECIFIED WHETHER COMPLICATED, UNSPECIFIED WHETHER PERSISTENT: ICD-10-CM

## 2023-09-29 DIAGNOSIS — E78.2 MIXED HYPERLIPIDEMIA: ICD-10-CM

## 2023-09-29 DIAGNOSIS — R53.83 MALAISE AND FATIGUE: ICD-10-CM

## 2023-09-29 RX ORDER — MELOXICAM 15 MG/1
15 TABLET ORAL DAILY
Qty: 90 TABLET | Refills: 3 | Status: SHIPPED | OUTPATIENT
Start: 2023-09-29

## 2023-09-29 RX ORDER — ASPIRIN 81 MG/1
81 TABLET ORAL DAILY
COMMUNITY

## 2023-09-29 RX ORDER — SIMVASTATIN 20 MG
20 TABLET ORAL
Qty: 90 TABLET | Refills: 3 | Status: SHIPPED | OUTPATIENT
Start: 2023-09-29

## 2023-09-29 NOTE — ASSESSMENT & PLAN NOTE
Patient's (Body mass index is 27.17 kg/m².) indicates that they are overweight with health conditions that include dyslipidemias . Weight is unchanged. BMI is is above average; BMI management plan is completed. We discussed portion control and increasing exercise.

## 2023-10-31 DIAGNOSIS — M54.41 ACUTE RIGHT-SIDED LOW BACK PAIN WITH RIGHT-SIDED SCIATICA: ICD-10-CM

## 2023-10-31 RX ORDER — CYCLOBENZAPRINE HCL 10 MG
TABLET ORAL
Qty: 30 TABLET | Refills: 2 | Status: SHIPPED | OUTPATIENT
Start: 2023-10-31

## 2024-02-08 DIAGNOSIS — M54.41 ACUTE RIGHT-SIDED LOW BACK PAIN WITH RIGHT-SIDED SCIATICA: ICD-10-CM

## 2024-02-09 RX ORDER — CYCLOBENZAPRINE HCL 10 MG
TABLET ORAL
Qty: 30 TABLET | Refills: 2 | Status: SHIPPED | OUTPATIENT
Start: 2024-02-09

## 2024-05-09 ENCOUNTER — OFFICE VISIT (OUTPATIENT)
Dept: FAMILY MEDICINE CLINIC | Facility: CLINIC | Age: 64
End: 2024-05-09
Payer: COMMERCIAL

## 2024-05-09 ENCOUNTER — HOSPITAL ENCOUNTER (OUTPATIENT)
Dept: GENERAL RADIOLOGY | Facility: HOSPITAL | Age: 64
Discharge: HOME | End: 2024-05-09
Admitting: NURSE PRACTITIONER
Payer: COMMERCIAL

## 2024-05-09 VITALS
WEIGHT: 156.4 LBS | OXYGEN SATURATION: 97 % | HEART RATE: 73 BPM | SYSTOLIC BLOOD PRESSURE: 156 MMHG | BODY MASS INDEX: 27.71 KG/M2 | HEIGHT: 63 IN | TEMPERATURE: 97.7 F | RESPIRATION RATE: 18 BRPM | DIASTOLIC BLOOD PRESSURE: 97 MMHG

## 2024-05-09 DIAGNOSIS — M25.561 ACUTE PAIN OF RIGHT KNEE: Primary | ICD-10-CM

## 2024-05-09 DIAGNOSIS — M25.469 EDEMA OF KNEE: ICD-10-CM

## 2024-05-09 PROCEDURE — 73562 X-RAY EXAM OF KNEE 3: CPT

## 2024-05-09 PROCEDURE — 99213 OFFICE O/P EST LOW 20 MIN: CPT | Performed by: NURSE PRACTITIONER

## 2024-05-09 RX ORDER — ETODOLAC 400 MG/1
400 TABLET, FILM COATED ORAL EVERY 12 HOURS PRN
Qty: 60 TABLET | Refills: 0 | Status: SHIPPED | OUTPATIENT
Start: 2024-05-09

## 2024-05-22 DIAGNOSIS — M54.41 ACUTE RIGHT-SIDED LOW BACK PAIN WITH RIGHT-SIDED SCIATICA: ICD-10-CM

## 2024-05-22 RX ORDER — CYCLOBENZAPRINE HCL 10 MG
TABLET ORAL
Qty: 30 TABLET | Refills: 2 | Status: SHIPPED | OUTPATIENT
Start: 2024-05-22

## 2024-06-05 DIAGNOSIS — M25.561 ACUTE PAIN OF RIGHT KNEE: ICD-10-CM

## 2024-06-05 RX ORDER — ETODOLAC 400 MG/1
400 TABLET, FILM COATED ORAL EVERY 12 HOURS PRN
Qty: 60 TABLET | Refills: 0 | OUTPATIENT
Start: 2024-06-05

## 2024-06-06 NOTE — PROGRESS NOTES
Office Note     Name: Asia Barrios    : 1960     MRN: [id number]     Chief Complaint  Knee Pain (Right knee)    Subjective     History of Present Illness:  Asia Barrios is a 64 y.o. female who presents today for Right knee pain that is causing pain and swelling. Started neuropathy oil, and turmeric and vitamins patient stated has improved some.   Knee Pain   The incident occurred more than 1 week ago. There was no injury mechanism. The pain is present in the right knee. The quality of the pain is described as aching. The pain is moderate. The pain has been Constant since onset. Associated symptoms include an inability to bear weight. She reports no foreign bodies present. She has tried ice, elevation and heat for the symptoms. The treatment provided mild relief.       She was seen in our office by fellow provider recently for knee pain.  Flat imaging noted degenerative changes and nonspecific intra-articular radiopaque bodies.  She is here with continued pain that is not relieving with oral medications.  MRI of the knee will be ordered referral will be placed for orthopedics evaluation.  She has a history of using meloxicam in the past for chronic low back pain and is currently prescribed cyclobenzaprine by her primary care provider.      History of Present Illness  The patient is a 62-year-old female who presents for evaluation of right knee pain. She is accompanied by an adult male.    The patient reports experiencing right knee pain and swelling upon awakening, which intensified upon ambulation. She recounts an incident where she was unable to bear weight on the affected knee after yard work over the weekend. Despite a change in arthritis medication by Dr. Agustin, the pain persisted for several weeks. She initiated a regimen of turmeric last Friday and applied neuropathy ointment last weekend. Last Thursday, she experienced severe pain, which ceased after a day of standing and walking. She has a  history of frequent falls, but denies any recent injuries. She recalls a past knee injury in her youth. Several months prior, she experienced buckling in both knees, but within the past month, she has been experiencing locking in the other knee. She is uncertain about any grinding or bone-on-bone contact. She has noticed fluid accumulation in the front and back of her knee, which has significantly reduced in size. She was previously prescribed meloxicam by Dr. Melgar, which was later switched to Lodine. She has exhausted her supply of Lodine and is uncertain of its efficacy. She has a knee sleeve, which she discontinued due to prolonged use due to pain. Elevation and applying ice to the knee provide some relief, but she is unable to flex her knee. She has been performing exercises. She also reports a popping sensation in her knee.    Allergies   Allergen Reactions    Penicillin G Rash         Current Outpatient Medications:     albuterol sulfate  (90 Base) MCG/ACT inhaler, Inhale 2 puffs Every 4 (Four) Hours., Disp: , Rfl:     aspirin 81 MG EC tablet, Take 1 tablet by mouth Daily., Disp: , Rfl:     Cholecalciferol (VITAMIN D) 25 MCG (1000 UT) tablet, Take 1 tablet by mouth Daily., Disp: , Rfl:     Cinnamon 500 MG capsule, Take 1 capsule by mouth Daily., Disp: , Rfl:     cyclobenzaprine (FLEXERIL) 10 MG tablet, TAKE ONE-HALF TO ONE TAB BY MOUTH IN THE EVENING AS NEEDED FOR SPASMS, Disp: 30 tablet, Rfl: 2    etodolac (LODINE) 400 MG tablet, Take 1 tablet by mouth Every 12 (Twelve) Hours As Needed (pain)., Disp: 60 tablet, Rfl: 0    meloxicam (MOBIC) 15 MG tablet, Take 1 tablet by mouth Daily., Disp: 90 tablet, Rfl: 3    montelukast (SINGULAIR) 10 MG tablet, TAKE 1 TABLET BY MOUTH EVERY DAY, Disp: 90 tablet, Rfl: 4    simvastatin (ZOCOR) 20 MG tablet, Take 1 tablet by mouth every night at bedtime., Disp: 90 tablet, Rfl: 3    Review of Systems   Musculoskeletal:  Positive for arthralgias, gait problem, joint  "swelling and myalgias.   All other systems reviewed and are negative.      Social History     Socioeconomic History    Marital status:    Tobacco Use    Smoking status: Never    Smokeless tobacco: Never   Vaping Use    Vaping status: Never Used   Substance and Sexual Activity    Alcohol use: Yes     Comment: Occasional    Drug use: No    Sexual activity: Defer       Family History   Problem Relation Age of Onset    Diabetes Mother         Mellitus    Hypertension Mother     Osteoporosis Mother     Skin cancer Father     Leukemia Father     No Known Problems Sister     Colon cancer Maternal Grandmother     Diabetes Maternal Grandmother         Mellitus    Lung cancer Maternal Grandfather     Alzheimer's disease Paternal Grandmother     Prostate cancer Paternal Grandfather            6/7/2024    11:18 AM   PHQ-2/PHQ-9 Depression Screening   Little Interest or Pleasure in Doing Things 0-->not at all   Feeling Down, Depressed or Hopeless 0-->not at all   PHQ-9: Brief Depression Severity Measure Score 0       Fall Risk Screen:  BRIA Fall Risk Assessment has not been completed.      Objective     /90 (BP Location: Right arm, Patient Position: Sitting, Cuff Size: Large Adult)   Pulse 91   Resp 18   Ht 160 cm (62.99\")   Wt 70.9 kg (156 lb 6.4 oz)   SpO2 94%   BMI 27.71 kg/m²     BP Readings from Last 2 Encounters:   06/07/24 142/90   05/09/24 156/97       Wt Readings from Last 2 Encounters:   06/07/24 70.9 kg (156 lb 6.4 oz)   05/09/24 70.9 kg (156 lb 6.4 oz)                Physical Exam  Vitals and nursing note reviewed.   Constitutional:       Appearance: Normal appearance.   Musculoskeletal:      Right knee: Swelling, deformity and effusion present. No bony tenderness or crepitus. Tenderness present over the MCL. Normal alignment and normal patellar mobility. Normal pulse.      Instability Tests: Anterior drawer test negative. Posterior drawer test positive.      Left knee: Normal.   Neurological:    "   Mental Status: She is alert and oriented to person, place, and time.   Psychiatric:         Mood and Affect: Mood normal.         Behavior: Behavior normal.         Thought Content: Thought content normal.       Derm Physical Exam  Physical Exam  Tenderness and inflammation noted in the medial collateral ligament (MCL) area of the right knee, with a posterior suspected Baker's cyst or fluid collection.    Result Review :       Results      Assessment and Plan            Diagnoses and all orders for this visit:    1. Acute pain of right knee (Primary)  -     MRI Knee Right Without Contrast  -     Ambulatory Referral to Orthopedic Surgery       Assessment & Plan  1. Pain in the right knee.  The patient is advised to utilize an Ace wrap for support. An MRI of the knee has been ordered and a referral to an orthopedic specialist has been made. She is advised to continue her meloxicam regimen, apply ice, rest, apply compression, elevate the knee, and use Flexeril as prescribed. Should the patient not receive a call for the referral within a week, she is advised to contact us.       Follow Up   Wrapup Tab  No follow-ups on file.     Patient was given instructions and counseling regarding her condition or for health maintenance advice. Please see specific information pulled into the AVS if appropriate.  Hand hygiene was performed during entrance to exam room and following assessment of patient. This document is intended for medical expert use only.       SANDEEP Evangelista, FNP-C  [address]  [facility]  [address] [place] [address] DR KEVIN AZAR [address] [place] [address] [place] [id number] [phone]    Patient or patient representative verbalized consent for the use of Ambient Listening during the visit with  SANDEEP Evangelista for chart documentation. 6/7/2024  11:43 EDT

## 2024-06-07 ENCOUNTER — OFFICE VISIT (OUTPATIENT)
Dept: FAMILY MEDICINE CLINIC | Facility: CLINIC | Age: 64
End: 2024-06-07
Payer: COMMERCIAL

## 2024-06-07 VITALS
DIASTOLIC BLOOD PRESSURE: 90 MMHG | RESPIRATION RATE: 18 BRPM | HEART RATE: 91 BPM | WEIGHT: 156.4 LBS | SYSTOLIC BLOOD PRESSURE: 142 MMHG | OXYGEN SATURATION: 94 % | HEIGHT: 63 IN | BODY MASS INDEX: 27.71 KG/M2

## 2024-06-07 DIAGNOSIS — M25.561 ACUTE PAIN OF RIGHT KNEE: Primary | ICD-10-CM

## 2024-06-07 PROCEDURE — 99213 OFFICE O/P EST LOW 20 MIN: CPT

## 2024-06-28 ENCOUNTER — OFFICE VISIT (OUTPATIENT)
Dept: ORTHOPEDIC SURGERY | Facility: CLINIC | Age: 64
End: 2024-06-28
Payer: COMMERCIAL

## 2024-06-28 VITALS — HEART RATE: 87 BPM | HEIGHT: 67 IN | OXYGEN SATURATION: 97 % | WEIGHT: 150 LBS | BODY MASS INDEX: 23.54 KG/M2

## 2024-06-28 DIAGNOSIS — M17.11 PRIMARY OSTEOARTHRITIS OF RIGHT KNEE: Primary | ICD-10-CM

## 2024-06-28 PROCEDURE — 99203 OFFICE O/P NEW LOW 30 MIN: CPT | Performed by: FAMILY MEDICINE

## 2024-06-28 RX ORDER — THIAMINE HCL 100 MG
TABLET ORAL DAILY
COMMUNITY

## 2024-06-28 RX ORDER — TRIAMCINOLONE ACETONIDE 40 MG/ML
80 INJECTION, SUSPENSION INTRA-ARTICULAR; INTRAMUSCULAR
Status: COMPLETED | OUTPATIENT
Start: 2024-06-28 | End: 2024-06-28

## 2024-06-28 RX ADMIN — TRIAMCINOLONE ACETONIDE 80 MG: 40 INJECTION, SUSPENSION INTRA-ARTICULAR; INTRAMUSCULAR at 13:53

## 2024-06-28 NOTE — PROGRESS NOTES
Procedure   Large Joint Arthrocentesis: R knee  Date/Time: 6/28/2024 1:53 PM  Consent given by: patient  Site marked: site marked  Timeout: Immediately prior to procedure a time out was called to verify the correct patient, procedure, equipment, support staff and site/side marked as required   Supporting Documentation  Indications: pain   Procedure Details  Location: knee - R knee  Preparation: Patient was prepped and draped in the usual sterile fashion  Needle size: 25 G  Approach: anteromedial  Medications administered: 80 mg triamcinolone acetonide 40 MG/ML (2cc of 1% lidocaine without epinepherine)  Patient tolerance: patient tolerated the procedure well with no immediate complications (Blood loss negligable, pt admits to immediate decrease in pain and improved ROM with gentle ambulation post injection.)

## 2024-06-28 NOTE — PROGRESS NOTES
Primary Care Sports Medicine Office Visit Note    Patient ID: Asia Barrios is a 64 y.o. female.    Chief Complaint:  Chief Complaint   Patient presents with    Right Knee - Pain, Initial Evaluation     On going for 1 1/2 months   Pain 3/10       History of Present Illness  The patient presents for evaluation of right knee pain.    Approximately one and a half months ago, the patient awoke with knee swelling and pain during ambulation. Initially, she exhibited an antalgic gait, which subsequently ceased. However, a week later, she engaged in yard work, which exacerbated the pain throughout the day. By the end of the day, she was unable to bear weight on the affected knee. Despite the application of ice packs, heating pads, and ArthroCream, the swelling persisted. A co-worker suspected a blood clot. Despite the pain, she retains full range of motion and experiences shooting pain when moving her leg in certain ways. She sought medical attention and underwent x-rays. A few weeks later, the pain persisted, to the extent that it nearly brought her to tears. She also reports a sensation of her knee locking up, frequent popping, and intermittent buckling for approximately a year. Initially, the buckling would occur several times a day, then resolve for a few weeks, and then recur. However, she has not experienced any buckling in either knee recently. An MRI was ordered, but her insurance denied it.       Past Medical History:   Diagnosis Date    Acute bilateral thoracic back pain     Impression: Symptoms seem limited to the mid thoracic spine. Tender on palpation. May be been triggered by gardening. Trial of Duexis, methocarbamol. Prescription(s) as below. Medication(s) usage and side effects discussed. Follow-up next week for re-evaluation if needed.    Acute respiratory disease     Acute right-sided low back pain with right-sided sciatica     Impression: strain ice 20 minutes bid nsaids Rehabilitation exercises discussed.  Consider imaging if persistent symptoms.    Ankle sprain     Annual physical exam     Impression: doing well, vaccines current discussed calcium, vit d followed by obgyn Age specific anticipatory guidance and warning signs discussed. Diet, exercise, and lifestyle modification discussed. Safety, seatbelts, and routine screening examinations discussed. Discussed self-examinations.    Annual visit for general adult medical examination with abnormal findings     Impression: doing well, vaccines current Age specific anticipatory guidance and warning signs discussed. Diet, exercise, and lifestyle modification discussed. Safety, seatbelts, and routine screening examinations discussed. Discussed self-examinations.    Arthritis of back ?? 6 years    upper back    Asthma     Impression: much improved continue pulm toilet    Bursitis of hip     Chest pain     Chest pain, atypical     Impression: EKG normal/stable. No cardiomegally noted on imaging. No GI symptoms. No respiratory symptoms.    Colon cancer screening     Impression: recommend colonoscopy, she plans to schedule later this year    Cough     Foot pain     Impression: strain ice 20 minutes bid nsaids Rehabilitation exercises discussed. Consider imaging if persistent symptoms.    HLD (hyperlipidemia)     Impression: mild elevation Discussed diet, exercise, lifestyle modification. check fasting labs she cancelled cardiac stress testing, recommend reschedue she declines 2nd cost ------------- Stable Compliant with meds Is getting adequate diet and exercise Goals developed at last visit were met Care management needs are self addressed.    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 check fadting labs ------------- Stable  "Compliant with meds Is getting adequate diet and exercise Goals developed at last visit were met Care management needs are self addressed.    OA (osteoarthritis)     Impression: multiple joints ice 20 minutes bid nsaids Rehabilitation exercises discussed. Consider imaging if persistent symptoms.    Overweight (BMI 25.0-29.9)     Screening for depression     Negative Depression Screening (4 or less) ()    Short of breath on exertion     Impression: recommend cardiac stress test , she declines secondary to cost       Past Surgical History:   Procedure Laterality Date    COLONOSCOPY  11/13/2015    FACIAL LACERATIONS REPAIR  1977       Family History   Problem Relation Age of Onset    Diabetes Mother         Mellitus    Hypertension Mother     Osteoporosis Mother     Cancer Mother     Skin cancer Father     Leukemia Father     Cancer Father     No Known Problems Sister     Colon cancer Maternal Grandmother     Diabetes Maternal Grandmother         Mellitus    Cancer Maternal Grandmother     Lung cancer Maternal Grandfather     Cancer Maternal Grandfather     Alzheimer's disease Paternal Grandmother     Prostate cancer Paternal Grandfather     Cancer Paternal Uncle      Social History     Occupational History     Employer: [employer]   Tobacco Use    Smoking status: Never    Smokeless tobacco: Never   Vaping Use    Vaping status: Never Used   Substance and Sexual Activity    Alcohol use: Yes     Comment: Occasional    Drug use: No    Sexual activity: Defer        Review of Systems:  Review of Systems   Constitutional:  Negative for activity change, fatigue and fever.   Musculoskeletal:  Positive for arthralgias.   Skin:  Negative for color change and rash.   Neurological:  Negative for numbness.     Objective:  Physical Exam  Pulse 87   Ht 170.2 cm (67\")   Wt 68 kg (150 lb)   SpO2 97%   BMI 23.49 kg/m²   Vitals and nursing note reviewed.   Constitutional:       General: she  is not in acute " distress.     Appearance: she is well-developed. He is not diaphoretic.   HENT:      Head: Normocephalic and atraumatic.   Eyes:      Conjunctiva/sclera: Conjunctivae normal.   Pulmonary:      Effort: Pulmonary effort is normal. No respiratory distress.   Skin:     General: Skin is warm.      Capillary Refill: Capillary refill takes less than 2 seconds.   Neurological:      Mental Status: she is alert.     Ortho Exam:  Physical Exam  Right knee examination reveals no obvious erythema or bruises. There is a moderately sized Baker's cyst with palpation of the posterolateral aspect of the knee of near 1 cm x 1 cm by likely at minimum 1 cm deep. Range of motion is near full from 0 to about 120 degrees as opposed to the contralateral which exhibits 130 degrees of flexion. Varus and valgus stress tests are negative. Lachman is negative. Medial and lateral León's tests are negative. Ossaly's test is negative. Patellar grind is positive.    Results  Imaging  Three view XR of the right knee reveals considerable near bone on bone osteoarthritic disease of the patellofemoral joint with superior osteophytic activity. There are two calcific foreign bodies of the anterior joint space best seen on lateral view. Otherwise, medial and lateral compartments are well preserved. No acute findings.    Assessment & Plan    1. Primary osteoarthritis of the right knee    2. Foreign body of the right knee    3. Right knee instability    I discussed pathology and treatment options with the patient today.  We discussed MR versus symptom guided treatment today.  We discussed possible foreign body in the anterior compartment and this causing instability.  She has considerable patellofemoral osteoarthropathy as well.  For that reason, she elects to proceed with intra-articular corticosteroid injection.  She tolerated this well without complaint or problems.  I would like to see her back in 1 month to discuss instability, status postinjection.  " Should it remain we could always consider MRI and further treatment, should symptomatology improved with corticosteroid injection only no further treatment will be necessary at that time.  RTC 1 month    Robin MCCOY. \"Chance\" Matthew ALTAMIRANO DO, CAQS  06/28/24  13:39 EDT    Disclaimer: Please note that areas of this note were completed with computer voice recognition software.  Quite often unanticipated grammatical, syntax, homophones, and other interpretive errors are inadvertently transcribed by the computer software. Please excuse any errors that have escaped final proofreading.    Patient or patient representative verbalized consent for the use of Ambient Listening during the visit with  Robin Castro II, DO for chart documentation. 13:39 EDT 06/28/24   "

## 2024-07-06 DIAGNOSIS — E78.2 MIXED HYPERLIPIDEMIA: ICD-10-CM

## 2024-07-08 RX ORDER — SIMVASTATIN 20 MG
20 TABLET ORAL
Qty: 90 TABLET | Refills: 3 | Status: SHIPPED | OUTPATIENT
Start: 2024-07-08

## 2024-07-26 ENCOUNTER — OFFICE VISIT (OUTPATIENT)
Dept: ORTHOPEDIC SURGERY | Facility: CLINIC | Age: 64
End: 2024-07-26
Payer: COMMERCIAL

## 2024-07-26 VITALS — HEIGHT: 67 IN | HEART RATE: 87 BPM | OXYGEN SATURATION: 100 % | BODY MASS INDEX: 23.54 KG/M2 | WEIGHT: 150 LBS

## 2024-07-26 DIAGNOSIS — M17.11 PRIMARY OSTEOARTHRITIS OF RIGHT KNEE: Primary | ICD-10-CM

## 2024-07-26 PROCEDURE — 99213 OFFICE O/P EST LOW 20 MIN: CPT | Performed by: FAMILY MEDICINE

## 2024-07-26 NOTE — PROGRESS NOTES
Primary Care Sports Medicine Office Visit Note    Patient ID: Asia Barrios is a 64 y.o. female.    Chief Complaint:  Chief Complaint   Patient presents with    Right Knee - Follow-up, Pain     On going for 1 1/2 months   Pain 0/10       History of Present Illness  The patient presents for evaluation of knee pain.    The patient was last evaluated approximately one month ago, during which time he received an injection for his knee. Despite this, he reports an inability to fully extend or flex his knee. Despite the discomfort, he maintains a moderate level of activity, albeit with a fear of exacerbating his condition. Since the injection, he has not experienced any pain, albeit with occasional discomfort, particularly towards the end of the day. His mobility is unaffected, although he occasionally experiences instability, although he has not experienced any falls.       Past Medical History:   Diagnosis Date    Acute bilateral thoracic back pain     Impression: Symptoms seem limited to the mid thoracic spine. Tender on palpation. May be been triggered by gardening. Trial of Duexis, methocarbamol. Prescription(s) as below. Medication(s) usage and side effects discussed. Follow-up next week for re-evaluation if needed.    Acute respiratory disease     Acute right-sided low back pain with right-sided sciatica     Impression: strain ice 20 minutes bid nsaids Rehabilitation exercises discussed. Consider imaging if persistent symptoms.    Ankle sprain     Annual physical exam     Impression: doing well, vaccines current discussed calcium, vit d followed by obgyn Age specific anticipatory guidance and warning signs discussed. Diet, exercise, and lifestyle modification discussed. Safety, seatbelts, and routine screening examinations discussed. Discussed self-examinations.    Annual visit for general adult medical examination with abnormal findings     Impression: doing well, vaccines current Age specific anticipatory guidance  and warning signs discussed. Diet, exercise, and lifestyle modification discussed. Safety, seatbelts, and routine screening examinations discussed. Discussed self-examinations.    Arthritis of back ?? 6 years    upper back    Asthma     Impression: much improved continue pulm toilet    Bursitis of hip     Chest pain     Chest pain, atypical     Impression: EKG normal/stable. No cardiomegally noted on imaging. No GI symptoms. No respiratory symptoms.    Colon cancer screening     Impression: recommend colonoscopy, she plans to schedule later this year    Cough     Foot pain     Impression: strain ice 20 minutes bid nsaids Rehabilitation exercises discussed. Consider imaging if persistent symptoms.    HLD (hyperlipidemia)     Impression: mild elevation Discussed diet, exercise, lifestyle modification. check fasting labs she cancelled cardiac stress testing, recommend reschedue she declines 2nd cost ------------- Stable Compliant with meds Is getting adequate diet and exercise Goals developed at last visit were met Care management needs are self addressed.    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 check fadting labs ------------- Stable Compliant with meds Is getting adequate diet and exercise Goals developed at last visit were met Care management needs are self addressed.    OA (osteoarthritis)     Impression: multiple joints ice 20 minutes bid nsaids Rehabilitation exercises discussed. Consider imaging if persistent symptoms.    Overweight (BMI 25.0-29.9)     Screening for depression     Negative Depression Screening (4 or less) ()    Short of breath on exertion     Impression: recommend cardiac stress test , she declines secondary to cost       Past Surgical  "History:   Procedure Laterality Date    COLONOSCOPY  11/13/2015    FACIAL LACERATIONS REPAIR  1977       Family History   Problem Relation Age of Onset    Diabetes Mother         Mellitus    Hypertension Mother     Osteoporosis Mother     Cancer Mother     Skin cancer Father     Leukemia Father     Cancer Father     No Known Problems Sister     Colon cancer Maternal Grandmother     Diabetes Maternal Grandmother         Mellitus    Cancer Maternal Grandmother     Lung cancer Maternal Grandfather     Cancer Maternal Grandfather     Alzheimer's disease Paternal Grandmother     Prostate cancer Paternal Grandfather     Cancer Paternal Uncle      Social History     Occupational History     Employer: [employer]   Tobacco Use    Smoking status: Never    Smokeless tobacco: Never   Vaping Use    Vaping status: Never Used   Substance and Sexual Activity    Alcohol use: Yes     Comment: Occasional    Drug use: No    Sexual activity: Defer        Review of Systems:  Review of Systems   Constitutional:  Negative for activity change, fatigue and fever.   Musculoskeletal:  Positive for arthralgias.   Skin:  Negative for color change and rash.   Neurological:  Negative for numbness.     Objective:  Physical Exam  Pulse 87   Ht 170.2 cm (67\")   Wt 68 kg (150 lb)   SpO2 100%   BMI 23.49 kg/m²   Vitals and nursing note reviewed.   Constitutional:       General: she  is not in acute distress.     Appearance: she is well-developed. He is not diaphoretic.   HENT:      Head: Normocephalic and atraumatic.   Eyes:      Conjunctiva/sclera: Conjunctivae normal.   Pulmonary:      Effort: Pulmonary effort is normal. No respiratory distress.   Skin:     General: Skin is warm.      Capillary Refill: Capillary refill takes less than 2 seconds.   Neurological:      Mental Status: she is alert.     Ortho Exam:  Physical Exam  Right knee exhibits near full range of motion 0 to 130 degrees with tenderness palpation of medial joint " "line only.  Patellar grind test is positive.    Results  No new imaging today.    Assessment & Plan    1. Primary osteoarthritis of the right knee    Patient is doing well today, unfortunately MRI was denied by insurance.  No further instability episodes since injection with good relief in pain.  RTC if symptoms return for further treatment option discussion.    Robin CORREA \"Chance\" Matthew ALTAMIRANO DO, CAQSM  07/26/24  10:41 EDT    Disclaimer: Please note that areas of this note were completed with computer voice recognition software.  Quite often unanticipated grammatical, syntax, homophones, and other interpretive errors are inadvertently transcribed by the computer software. Please excuse any errors that have escaped final proofreading.    Patient or patient representative verbalized consent for the use of Ambient Listening during the visit with  Robin Castro II, DO for chart documentation. 10:41 EDT 07/26/24   "

## 2024-09-01 DIAGNOSIS — M54.41 ACUTE RIGHT-SIDED LOW BACK PAIN WITH RIGHT-SIDED SCIATICA: ICD-10-CM

## 2024-09-03 RX ORDER — CYCLOBENZAPRINE HCL 10 MG
TABLET ORAL
Qty: 30 TABLET | Refills: 2 | Status: SHIPPED | OUTPATIENT
Start: 2024-09-03

## 2024-12-15 DIAGNOSIS — M54.41 ACUTE RIGHT-SIDED LOW BACK PAIN WITH RIGHT-SIDED SCIATICA: ICD-10-CM

## 2024-12-16 RX ORDER — CYCLOBENZAPRINE HCL 10 MG
TABLET ORAL
Qty: 30 TABLET | Refills: 2 | Status: SHIPPED | OUTPATIENT
Start: 2024-12-16

## 2025-03-15 DIAGNOSIS — M54.41 ACUTE RIGHT-SIDED LOW BACK PAIN WITH RIGHT-SIDED SCIATICA: ICD-10-CM

## 2025-03-17 RX ORDER — CYCLOBENZAPRINE HCL 10 MG
TABLET ORAL
Qty: 30 TABLET | Refills: 2 | OUTPATIENT
Start: 2025-03-17

## 2025-04-04 ENCOUNTER — OFFICE VISIT (OUTPATIENT)
Dept: FAMILY MEDICINE CLINIC | Facility: CLINIC | Age: 65
End: 2025-04-04
Payer: MEDICARE

## 2025-04-04 VITALS
HEART RATE: 90 BPM | TEMPERATURE: 97.8 F | SYSTOLIC BLOOD PRESSURE: 122 MMHG | OXYGEN SATURATION: 93 % | WEIGHT: 156 LBS | DIASTOLIC BLOOD PRESSURE: 70 MMHG | BODY MASS INDEX: 24.48 KG/M2 | HEIGHT: 67 IN | RESPIRATION RATE: 18 BRPM

## 2025-04-04 DIAGNOSIS — R73.01 ELEVATED FASTING BLOOD SUGAR: ICD-10-CM

## 2025-04-04 DIAGNOSIS — M25.561 ACUTE PAIN OF RIGHT KNEE: ICD-10-CM

## 2025-04-04 DIAGNOSIS — N95.1 POST MENOPAUSAL SYNDROME: ICD-10-CM

## 2025-04-04 DIAGNOSIS — M54.41 ACUTE RIGHT-SIDED LOW BACK PAIN WITH RIGHT-SIDED SCIATICA: ICD-10-CM

## 2025-04-04 DIAGNOSIS — Z12.4 SCREENING FOR MALIGNANT NEOPLASM OF CERVIX: ICD-10-CM

## 2025-04-04 DIAGNOSIS — E78.2 MIXED HYPERLIPIDEMIA: ICD-10-CM

## 2025-04-04 DIAGNOSIS — M15.0 PRIMARY OSTEOARTHRITIS INVOLVING MULTIPLE JOINTS: ICD-10-CM

## 2025-04-04 DIAGNOSIS — Z12.11 SCREENING FOR MALIGNANT NEOPLASM OF COLON: ICD-10-CM

## 2025-04-04 DIAGNOSIS — Z00.00 WELCOME TO MEDICARE PREVENTIVE VISIT: Primary | ICD-10-CM

## 2025-04-04 DIAGNOSIS — N95.9 UNSPECIFIED MENOPAUSAL AND PERIMENOPAUSAL DISORDER: ICD-10-CM

## 2025-04-04 DIAGNOSIS — E66.3 OVERWEIGHT WITH BODY MASS INDEX (BMI) OF 27 TO 27.9 IN ADULT: ICD-10-CM

## 2025-04-04 DIAGNOSIS — Z12.31 SCREENING MAMMOGRAM FOR BREAST CANCER: ICD-10-CM

## 2025-04-04 DIAGNOSIS — J30.9 ALLERGIC RHINITIS, UNSPECIFIED: ICD-10-CM

## 2025-04-04 DIAGNOSIS — E11.9 NEW ONSET TYPE 2 DIABETES MELLITUS: ICD-10-CM

## 2025-04-04 LAB
EXPIRATION DATE: ABNORMAL
HBA1C MFR BLD: 9.1 % (ref 4.5–5.7)
Lab: ABNORMAL

## 2025-04-04 RX ORDER — DIPHENHYDRAMINE HYDROCHLORIDE 25 MG/1
1 CAPSULE, LIQUID FILLED ORAL ONCE
Qty: 1 EACH | Refills: 0 | Status: SHIPPED | OUTPATIENT
Start: 2025-04-04 | End: 2025-04-04

## 2025-04-04 RX ORDER — MONTELUKAST SODIUM 10 MG/1
10 TABLET ORAL DAILY
Qty: 90 TABLET | Refills: 3 | Status: SHIPPED | OUTPATIENT
Start: 2025-04-04

## 2025-04-04 RX ORDER — METHYLPREDNISOLONE ACETATE 40 MG/ML
40 INJECTION, SUSPENSION INTRA-ARTICULAR; INTRALESIONAL; INTRAMUSCULAR; SOFT TISSUE ONCE
Status: COMPLETED | OUTPATIENT
Start: 2025-04-04 | End: 2025-04-04

## 2025-04-04 RX ORDER — SIMVASTATIN 40 MG
40 TABLET ORAL DAILY
Qty: 90 TABLET | Refills: 3 | Status: SHIPPED | OUTPATIENT
Start: 2025-04-04

## 2025-04-04 RX ORDER — SIMVASTATIN 20 MG
20 TABLET ORAL
Qty: 90 TABLET | Refills: 3 | Status: SHIPPED | OUTPATIENT
Start: 2025-04-04 | End: 2025-04-04

## 2025-04-04 RX ORDER — CYCLOBENZAPRINE HCL 10 MG
5-10 TABLET ORAL NIGHTLY PRN
Qty: 30 TABLET | Refills: 2 | Status: SHIPPED | OUTPATIENT
Start: 2025-04-04

## 2025-04-04 RX ADMIN — METHYLPREDNISOLONE ACETATE 40 MG: 40 INJECTION, SUSPENSION INTRA-ARTICULAR; INTRALESIONAL; INTRAMUSCULAR; SOFT TISSUE at 15:29

## 2025-04-04 NOTE — PROGRESS NOTES
Lois Barrios is a 65 y.o. female.     Chief Complaint   Patient presents with    Welcome To Medicare    Hyperlipidemia       The patient is here: to discuss health maintenance and disease prevention to follow up on multiple medical problems.  Last comprehensive physical was on 9/29/2023.  Previous physical was performed by  Randolph Wilson MD  Overall has: moderate activity with work/home activities, good appetite, feels well with minor complaints, good energy level, and is sleeping well. Nutrition: appropriate balanced diet and eating a variety of foods. Last tetanus shot was  10/3/2020 .     Previous Exams:  Mammogram was on 3/24/2023   · No mammographic signs of malignancy. Recommend routine mammographic  screening.    · BI-RADS ASSESSMENT: BI-RADS 1. Negative.  Colonoscopy was on 11/13/215 by Dr Zepeda.    · Polyp (5 mm) in the ileo cecal valve   · Mild diverticulosis of the sigmoid colon   · External hemorrhoids   · Otherwise normal colonoscopy to the terminal ileum  EKG was on 4/7/2017    History of Present Illness     Recent Hospitalizations:  No hospitalization(s) within the last year..  [facility] (formerly [facility]3]    Patient Care Team:  Randolph Wilson MD as PCP - General  ItaliaCarol snowden MD as Consulting Physician (Obstetrics and Gynecology)     I personally reviewed and updated the patient's allergies, medications, problem list, and past medical, surgical, social, and family history. I have reviewed and confirmed the accuracy of the HPI and ROS as documented by the MA/LPN/RN Aisha Greenberg MA      Family History   Problem Relation Age of Onset    Diabetes Mother         Mellitus    Hypertension Mother     Osteoporosis Mother     Cancer Mother     Arthritis Mother     Skin cancer Father     Leukemia Father     Cancer Father     Stroke Father     Vision loss Father     No Known Problems Sister     Colon cancer Maternal Grandmother     Diabetes Maternal Grandmother         Mellitus    Cancer Maternal Grandmother      Lung cancer Maternal Grandfather     Cancer Maternal Grandfather     Alzheimer's disease Paternal Grandmother     Prostate cancer Paternal Grandfather     Cancer Paternal Uncle        Social History     Tobacco Use    Smoking status: Never    Smokeless tobacco: Never   Vaping Use    Vaping status: Never Used   Substance Use Topics    Alcohol use: Not Currently     Comment: Occasional    Drug use: No       Past Surgical History:   Procedure Laterality Date    COLONOSCOPY  11/13/2015    FACIAL LACERATIONS REPAIR  1977       Patient Active Problem List   Diagnosis    Acute bilateral thoracic back pain    Acute right-sided low back pain with right-sided sciatica    Annual visit for general adult medical examination with abnormal findings    Arthritis    OA (osteoarthritis)    Asthma    Colon cancer screening    Foot pain    Mixed hyperlipidemia    IBS (irritable bowel syndrome)    Lesion of palate    Overweight with body mass index (BMI) of 27 to 27.9 in adult    Onychomycosis    Screening for malignant neoplasm of cervix    Encounter for screening mammogram for malignant neoplasm of breast    Elevated fasting blood sugar    Other constipation         Current Outpatient Medications:     albuterol sulfate  (90 Base) MCG/ACT inhaler, Inhale 2 puffs Every 4 (Four) Hours., Disp: , Rfl:     aspirin 81 MG EC tablet, Take 1 tablet by mouth Daily., Disp: , Rfl:     Calcium Citrate-Vitamin D3 (CITRACAL) 315-6.25 MG-MCG tablet tablet, Take  by mouth Daily., Disp: , Rfl:     Cholecalciferol (VITAMIN D) 25 MCG (1000 UT) tablet, Take 1 tablet by mouth Daily., Disp: , Rfl:     Cinnamon 500 MG capsule, Take 1 capsule by mouth Daily., Disp: , Rfl:     cyclobenzaprine (FLEXERIL) 10 MG tablet, TAKE ONE-HALF TO ONE TABLET BY MOUTH IN THE EVENING AS NEEDED FOR SPASMS, Disp: 30 tablet, Rfl: 2    meloxicam (MOBIC) 15 MG tablet, Take 1 tablet by mouth Daily., Disp: 90 tablet, Rfl: 3    montelukast (SINGULAIR) 10 MG tablet, TAKE 1  "TABLET BY MOUTH EVERY DAY, Disp: 90 tablet, Rfl: 4    simvastatin (ZOCOR) 20 MG tablet, TAKE 1 TABLET BY MOUTH EVERYDAY AT BEDTIME, Disp: 90 tablet, Rfl: 3    Turmeric (QC TUMERIC COMPLEX PO), Take  by mouth., Disp: , Rfl:     etodolac (LODINE) 400 MG tablet, Take 1 tablet by mouth Every 12 (Twelve) Hours As Needed (pain). (Patient not taking: Reported on 4/4/2025), Disp: 60 tablet, Rfl: 0    Ibuprofen-diphenhydrAMINE Cit (ADVIL PM PO), Take  by mouth. (Patient not taking: Reported on 4/4/2025), Disp: , Rfl:     No opioid medication identified on active medication list. I have reviewed chart for other potential  high risk medication/s and harmful drug interactions in the elderly.          Objective   /70 (BP Location: Left arm, Patient Position: Sitting, Cuff Size: Adult)   Pulse 90   Temp 97.8 °F (36.6 °C) (Temporal)   Resp 18   Ht 170.2 cm (67.01\")   Wt 70.8 kg (156 lb)   SpO2 93%   BMI 24.43 kg/m²   BP Readings from Last 3 Encounters:   04/04/25 122/70   06/07/24 142/90   05/09/24 156/97     Wt Readings from Last 3 Encounters:   04/04/25 70.8 kg (156 lb)   07/26/24 68 kg (150 lb)   06/28/24 68 kg (150 lb)       Vision Screening    Right eye Left eye Both eyes   Without correction      With correction 20/25 20/30 20/25        Age-appropriate Screening Schedule:  Refer to the list below for future screening recommendations based on patient's age, sex and/or medical conditions. Orders for these [employer] [employer] tests are listed in the plan section. The patient has been provided with a written plan.    Health Maintenance   Topic Date Due    DXA SCAN  Never done    ZOSTER VACCINE (1 of 2) Never done    HEPATITIS C SCREENING  Never done    ANNUAL WELLNESS VISIT  Never done    COVID-19 Vaccine (4 - 2024-25 season) 09/01/2024    MAMMOGRAM  03/24/2025    INFLUENZA VACCINE  07/01/2025    COLORECTAL CANCER SCREENING  11/13/2025    LIPID PANEL  03/28/2026    TDAP/TD VACCINES (2 - Td or Tdap) 10/03/2030    " Pneumococcal Vaccine 50+  Completed       Fall Risk Screen:    BRIA Fall Risk Assessment was completed, and patient is at LOW risk for falls.Assessment completed on:2025    Depression Screen:       2025     2:25 PM   PHQ-2/PHQ-9 Depression Screening   Little interest or pleasure in doing things Not at all   Feeling down, depressed, or hopeless Not at all   How difficult have these problems made it for you to do your work, take care of things at home, or get along with other people? Not difficult at all     I spent more than 16 minutes asking patient questions, counseling and documenting in the chart.    Health Habits and Functional and Cognitive Screenin/4/2025     2:00 PM   Functional & Cognitive Status   Do you have difficulty preparing food and eating? No   Do you have difficulty bathing yourself, getting dressed or grooming yourself? No   Do you have difficulty using the toilet? No   Do you have difficulty moving around from place to place? No   Do you have trouble with steps or getting out of a bed or a chair? No   Current Diet Well Balanced Diet   Dental Exam Up to date   Eye Exam Up to date   Exercise (times per week) 0 times per week   Current Exercises Include No Regular Exercise   Do you need help using the phone?  No   Are you deaf or do you have serious difficulty hearing?  No   Do you need help to go to places out of walking distance? No   Do you need help shopping? No   Do you need help preparing meals?  No   Do you need help with housework?  No   Do you need help with laundry? No   Do you need help taking your medications? No   Do you need help managing money? No   Do you ever drive or ride in a car without wearing a seat belt? No   Have you felt unusual stress, anger or loneliness in the last month? No   Who do you live with? Spouse   If you need help, do you have trouble finding someone available to you? No   Have you been bothered in the last four weeks by sexual problems? No    Do you have difficulty concentrating, remembering or making decisions? No       Does the patient have evidence of cognitive impairment? No    Advance Care Planning:  ACP discussion was held with the patient during this visit. Patient has an advance directive (not in EMR), copy requested.     A face-to-face visit was completed today with patient.  Counseling explanation, and discussion of advanced directives was performed.   The last advanced care visit was performed in 2025.  In a near life ending situation, from which she is not expected to recover functionally, and she is not able to speak for her, she does not want life sustaining measures. We discussed feeding tubes, ventilators and cardiac support as well as dialysis.    I spent more than 16 minutes discussing Advanced Care Planning information and documenting in the chart.    Identification of Risk Factors:  Risk factors include: Advance Directive Discussion  Breast Cancer/Mammogram Screening  Colon Cancer Screening  Dementia/Memory   Fall Risk  Immunizations Discussed/Encouraged (specific immunizations; Tdap, Influenza, Prevnar 20 (Pneumococcal 20-valent conjugate), Shingrix, COVID19, and RSV (Respiratory Syncytial Virus) )  Obesity/Overweight   Osteoporosis Risk  Urinary Incontinence  Dental Screening Recommended; [facility] DENTAL SCREENING RECOMMENDED:  Vision Screening Recommended; [facility] VISION SCREENING    Compared to one year ago, the patient feels her physical health is the same.  Compared to one year ago, the patient feels her mental health is the same.    Patient Self-Management and Personalized Health Advice  The patient has been provided with information about: diet, exercise, weight management, fall prevention, designing advance directives, supplements, and mental health concerns and preventive services including:   Alcohol Misuse Screening and Counseling  (15 minutes counseling time, Code )  Annual Wellness Visit (AWV)  Bone Density  Measurements  Cardiovascular Disease Screening Tests (may do this order every 5 years in beneficiaries without signs or symptoms of cardiovascular disease)  Colorectal Cancer Screening, Colonoscopy  Counseling to Prevent Tobacco Use (use of smartset and @cessation@ smartphrase for documentation)  Depression Screening (15 minutes face to face, Code ).      Assessment & Plan      Medications        Problem List         LOS      Diagnoses and all orders for this visit:    1. Welcome to Medicare preventive visit (Primary)    2. Mixed hyperlipidemia    3. Overweight with body mass index (BMI) of 27 to 27.9 in adult    4. Screening for malignant neoplasm of cervix    5. Screening for malignant neoplasm of colon    6. Elevated fasting blood sugar  -     POC Glycosylated Hemoglobin (Hb A1C)    7. Acute right-sided low back pain with right-sided sciatica    8. Allergic rhinitis, unspecified        Medicare wellness.  Discussed health maintenance, routine immunizations, screening tests, lifestyle modification.

## 2025-04-04 NOTE — PROGRESS NOTES
Lois Barrios is a 65 y.o. female.     Chief Complaint   Patient presents with    Welcome To Medicare    Hyperlipidemia       History of Present Illness         I personally reviewed and updated the patient's allergies, medications, problem list, and past medical, surgical, social, and family history. I have reviewed and confirmed the accuracy of the History of Present Illness and Review of Symptoms as documented by the MA/LPN/RN. Randolph Wilson MD    Family History   Problem Relation Age of Onset    Diabetes Mother         Mellitus    Hypertension Mother     Osteoporosis Mother     Cancer Mother     Arthritis Mother     Skin cancer Father     Leukemia Father     Cancer Father     Stroke Father     Vision loss Father     No Known Problems Sister     Colon cancer Maternal Grandmother     Diabetes Maternal Grandmother         Mellitus    Cancer Maternal Grandmother     Lung cancer Maternal Grandfather     Cancer Maternal Grandfather     Alzheimer's disease Paternal Grandmother     Prostate cancer Paternal Grandfather     Cancer Paternal Uncle        Social History     Tobacco Use    Smoking status: Never    Smokeless tobacco: Never   Vaping Use    Vaping status: Never Used   Substance Use Topics    Alcohol use: Not Currently     Comment: Occasional    Drug use: No       Past Surgical History:   Procedure Laterality Date    COLONOSCOPY  11/13/2015    FACIAL LACERATIONS REPAIR  1977       Patient Active Problem List   Diagnosis    Acute bilateral thoracic back pain    Acute right-sided low back pain with right-sided sciatica    Annual visit for general adult medical examination with abnormal findings    Arthritis    OA (osteoarthritis)    Asthma    Colon cancer screening    Foot pain    Mixed hyperlipidemia    IBS (irritable bowel syndrome)    Lesion of palate    Overweight with body mass index (BMI) of 27 to 27.9 in adult    Onychomycosis    Screening for malignant neoplasm of cervix    Encounter for  screening mammogram for malignant neoplasm of breast    Elevated fasting blood sugar    Other constipation    New onset type 2 diabetes mellitus         Current Outpatient Medications:     albuterol sulfate  (90 Base) MCG/ACT inhaler, Inhale 2 puffs Every 4 (Four) Hours., Disp: , Rfl:     aspirin 81 MG EC tablet, Take 1 tablet by mouth Daily., Disp: , Rfl:     Calcium Citrate-Vitamin D3 (CITRACAL) 315-6.25 MG-MCG tablet tablet, Take  by mouth Daily., Disp: , Rfl:     Cholecalciferol (VITAMIN D) 25 MCG (1000 UT) tablet, Take 1 tablet by mouth Daily., Disp: , Rfl:     Cinnamon 500 MG capsule, Take 1 capsule by mouth Daily., Disp: , Rfl:     cyclobenzaprine (FLEXERIL) 10 MG tablet, Take 0.5-1 tablets by mouth At Night As Needed for Muscle Spasms., Disp: 30 tablet, Rfl: 2    meloxicam (MOBIC) 15 MG tablet, Take 1 tablet by mouth Daily., Disp: 90 tablet, Rfl: 3    montelukast (SINGULAIR) 10 MG tablet, Take 1 tablet by mouth Daily., Disp: 90 tablet, Rfl: 3    simvastatin (ZOCOR) 40 MG tablet, Take 1 tablet by mouth Daily., Disp: 90 tablet, Rfl: 3    Turmeric (QC TUMERIC COMPLEX PO), Take  by mouth., Disp: , Rfl:     Blood Glucose Monitoring Suppl (Blood Glucose Monitor System) w/Device kit, Use 1 each 1 (One) Time for 1 dose. To check blood sugar daily for new onset type two diabetes any brand insurance covers, Disp: 1 each, Rfl: 0    etodolac (LODINE) 400 MG tablet, Take 1 tablet by mouth Every 12 (Twelve) Hours As Needed (pain). (Patient not taking: Reported on 4/4/2025), Disp: 60 tablet, Rfl: 0    Ibuprofen-diphenhydrAMINE Cit (ADVIL PM PO), Take  by mouth. (Patient not taking: Reported on 4/4/2025), Disp: , Rfl:     metFORMIN (GLUCOPHAGE) 500 MG tablet, Take 1 tablet by mouth 2 (Two) Times a Day With Meals., Disp: 180 tablet, Rfl: 2  No current facility-administered medications for this visit.         Review of Systems    I have reviewed and confirmed the accuracy of the ROS as documented by the MA/LPN/RN Randolph  "MD Katie      Objective   /70 (BP Location: Left arm, Patient Position: Sitting, Cuff Size: Adult)   Pulse 90   Temp 97.8 °F (36.6 °C) (Temporal)   Resp 18   Ht 170.2 cm (67.01\")   Wt 70.8 kg (156 lb)   SpO2 93%   BMI 24.43 kg/m²   BP Readings from Last 3 Encounters:   04/04/25 122/70   06/07/24 142/90   05/09/24 156/97     Wt Readings from Last 3 Encounters:   04/04/25 70.8 kg (156 lb)   07/26/24 68 kg (150 lb)   06/28/24 68 kg (150 lb)     Physical Exam    Data / Lab Results:    Hemoglobin A1C   Date Value Ref Range Status   04/04/2025 9.1 (A) 4.5 - 5.7 % Final   09/23/2022 6.3 % Final   09/17/2021 6.6 % Final     Lab Results   Component Value Date    Glucose 151 (H) 03/28/2025    Glucose 117 09/23/2022    Glucose, UA Negative 09/23/2022     Lab Results   Component Value Date     (H) 03/28/2025     (H) 09/22/2023     (H) 09/16/2022     Lab Results   Component Value Date    CHOL 237 (H) 08/13/2018    CHOL 239 (H) 09/30/2016     Lab Results   Component Value Date    TRIG 152 (H) 03/28/2025    TRIG 164 (H) 09/22/2023    TRIG 107 09/16/2022     Lab Results   Component Value Date    HDL 45 03/28/2025    HDL 55 09/22/2023    HDL 64 09/16/2022     No results found for: \"PSA\"  Lab Results   Component Value Date    WBC 5.4 03/28/2025    HGB 13.4 03/28/2025    HCT 41.8 03/28/2025    MCV 92 03/28/2025     03/28/2025     Lab Results   Component Value Date    TSH 2.470 03/28/2025      Lab Results   Component Value Date    GLUCOSE 151 (H) 03/28/2025    BUN 10 03/28/2025    CREATININE 0.69 03/28/2025    EGFRIFNONA 91 09/11/2021    EGFRIFAFRI 105 09/11/2021    BCR 14 03/28/2025    K 4.1 03/28/2025    CO2 25 03/28/2025    CALCIUM 9.6 03/28/2025    ALBUMIN 4.5 03/28/2025    AST 41 (H) 03/28/2025    ALT 52 (H) 03/28/2025     Lab Results   Component Value Date    NOA NEGATIVE 08/13/2018      Lab Results   Component Value Date    CRP 4.1 08/13/2018      No results found for: \"IRON\", \"TIBC\", " "\"FERRITIN\"   No results found for: \"WCNCTVJI34\"       Assessment & Plan     Joint injection  Injection site: RIGHT Knee  Date/Time: 04/04/25 15:26 EDT   Performed by:Randolph Wilson MD  Authorized by: Randolph Wilson MD   Preparation: Patient was prepped and draped in the usual sterile fashion.  Anesthesia:  Local anesthesia used: no  Sedation:  Patient sedated: no     Medications Used:  0.5cc  Marcaine: NDC- 0690-3680-35 Lot- HL EXP-8/1/2025  0.5cc Lidocaine 10mg/mL: NDC- 49594-297-03 Lot- [id number] EXP- 7/1/2028  1cc DepoMedrol 40mg: NDC- 0353-5709-13 Lot-  [id number] EXP-  8/1/2025     Asia Barrios  tolerated procedure well.     Procedure note: EKG today with normal sinus rhythm rate 74, flipped T's in III, no change from previous EKG 4/17, no significant ST or T wave changes     Medications        Problem List         LOS      Welcome to Medicare.  Doing well.  Vaccines current.  Discussed coated baby aspirin daily.  Discussed health maintenance, screening test, lifestyle modification.  Discussed calcium and vitamin D.  Followed by OB/GYN Dr. Brock past, serial Paps have been benign, mammogram/DEXA scheduled.,.  EKG benign today.  Type 2 diabetes.  New onset, A1c to 9.1.  Start metformin, monitor blood sugars at home.  Discussed diet, lifestyle modification.  Follow-up recheck.  Call or return if worsening symptoms.  Tinnitus.  Mild, refractory to holding aspirin, restart.  Consider HEENT referral if worsening symptoms.  Remote history of barotrauma  Hyperlipidemia.  Improved today, LDL to 122, tolerating statin, dose increased.  Discussed diet, exercise, lifestyle modification.  Recheck 1 year.  Asthma.  Overall good control.  Can continue Singulair/albuterol.  Consider daily inhaled steroids if worsening symptoms.  symptoms  Colon screening.  Cologuard scheduled.  History of colonoscopy with polypectomy by 1/2015, followed by gastroenterology.  COVID-19 viral respiratory infection.  Improved/resolved, has completed " quarantine.  Hip Pain.  Benign exam today, likely secondary to bursitis.  Start prednisone.  Ice, NSAIDs, rotation exercise discussed.  Call return persistent symptoms.    COVID-19 vaccine status: Vaccinated.  Constipation.  Refractory to Metamucil/MiraLAX, add Amitiza.  Colonoscopy benign 2015.  Consider gastroenterology eval if persistent symptoms.  Dysuria.  Some hesitancy/frequency.  Has upcoming exam per Dr. Bass.  Start Kegels.  Consider Detrol.  Family history melanoma.  Her dad.  Benign skin exam today.  Some protection discussed.  Continue to monitor.  OA knee.  Worse on the right, confirmed with x-ray.  Some improvement status post knee injection, repeat today.  Ice, rehabilitation exercises discussed.  Add glucosamine/chondroitin.  Follow-up recheck.  Call return if worsening symptoms.  Followed by orthopedics Dr. Castro.  Elevated LFTs.  Mild elevation, DDx includes secondary to fatty liver, add fish oil.  Follow-up recheck.  Consider further eval if persistent elevation.    Previous Exams:  Mammogram was on 3/24/2023   · No mammographic signs of malignancy. Recommend routine mammographic  screening.    · BI-RADS ASSESSMENT: BI-RADS 1. Negative.  Colonoscopy was on 11/13/215 by Dr Zepeda.    · Polyp (5 mm) in the ileo cecal valve   · Mild diverticulosis of the sigmoid colon   · External hemorrhoids   · Otherwise normal colonoscopy to the terminal ileum  EKG was on 4/7/2017      Diagnoses and all orders for this visit:    1. Welcome to Medicare preventive visit (Primary)  -     ECG 12 Lead    2. Mixed hyperlipidemia  -     Discontinue: simvastatin (ZOCOR) 20 MG tablet; Take 1 tablet by mouth every night at bedtime.  Dispense: 90 tablet; Refill: 3  -     simvastatin (ZOCOR) 40 MG tablet; Take 1 tablet by mouth Daily.  Dispense: 90 tablet; Refill: 3    3. Overweight with body mass index (BMI) of 27 to 27.9 in adult    4. Screening for malignant neoplasm of cervix    5. Screening for malignant neoplasm of  colon  -     Cologuard - Stool, Per Rectum; Future    6. Elevated fasting blood sugar  -     POC Glycosylated Hemoglobin (Hb A1C)    7. Acute right-sided low back pain with right-sided sciatica  -     cyclobenzaprine (FLEXERIL) 10 MG tablet; Take 0.5-1 tablets by mouth At Night As Needed for Muscle Spasms.  Dispense: 30 tablet; Refill: 2    8. Allergic rhinitis, unspecified  -     montelukast (SINGULAIR) 10 MG tablet; Take 1 tablet by mouth Daily.  Dispense: 90 tablet; Refill: 3    9. Screening mammogram for breast cancer  -     Mammo Screening Digital Tomosynthesis Bilateral With CAD; Future    10. Acute pain of right knee  -     methylPREDNISolone acetate (DEPO-medrol) injection 40 mg    11. Primary osteoarthritis involving multiple joints  -     DEXA Bone Density Axial; Future    12. Post menopausal syndrome  -     DEXA Bone Density Axial; Future    13. Unspecified menopausal and perimenopausal disorder  -     DEXA Bone Density Axial; Future    14. New onset type 2 diabetes mellitus  -     metFORMIN (GLUCOPHAGE) 500 MG tablet; Take 1 tablet by mouth 2 (Two) Times a Day With Meals.  Dispense: 180 tablet; Refill: 2  -     Blood Glucose Monitoring Suppl (Blood Glucose Monitor System) w/Device kit; Use 1 each 1 (One) Time for 1 dose. To check blood sugar daily for new onset type two diabetes any brand insurance covers  Dispense: 1 each; Refill: 0              Expected course, medications, and adverse effects discussed.  Call or return if worsening or persistent symptoms.  I wore protective equipment throughout this patient encounter including a mask, gloves, and eye protection.  Hand hygiene was performed before donning protective equipment and after removal when leaving the room. The complete contents of the Assessment and Plan and Data/Lab Results as documented above have been reviewed and addressed by myself with the patient today as part of an ongoing evaluation / treatment plan.  If some of the documentation has  been copied from a previous note and is unchanged it indicates that this problem / plan has been assessed today but is stable from a previous visit and no changes have been recommended.

## 2025-04-08 DIAGNOSIS — E11.65 TYPE 2 DIABETES MELLITUS WITH HYPERGLYCEMIA, WITHOUT LONG-TERM CURRENT USE OF INSULIN: Primary | ICD-10-CM

## 2025-04-08 RX ORDER — BLOOD-GLUCOSE METER
1 EACH MISCELLANEOUS DAILY
Qty: 1 KIT | Refills: 0 | Status: SHIPPED | OUTPATIENT
Start: 2025-04-08

## 2025-04-08 RX ORDER — LANCETS
EACH MISCELLANEOUS
Qty: 100 EACH | Refills: 0 | Status: SHIPPED | OUTPATIENT
Start: 2025-04-08

## 2025-04-22 LAB
NCCN CRITERIA FLAG: NORMAL
TYRER CUZICK SCORE: 5.5

## 2025-04-25 ENCOUNTER — HOSPITAL ENCOUNTER (OUTPATIENT)
Dept: BONE DENSITY | Facility: HOSPITAL | Age: 65
Discharge: HOME | End: 2025-04-25
Payer: MEDICARE

## 2025-04-25 ENCOUNTER — HOSPITAL ENCOUNTER (OUTPATIENT)
Dept: MAMMOGRAPHY | Facility: HOSPITAL | Age: 65
Discharge: HOME | End: 2025-04-25
Payer: MEDICARE

## 2025-04-25 DIAGNOSIS — Z12.31 SCREENING MAMMOGRAM FOR BREAST CANCER: ICD-10-CM

## 2025-04-25 DIAGNOSIS — N95.9 UNSPECIFIED MENOPAUSAL AND PERIMENOPAUSAL DISORDER: ICD-10-CM

## 2025-04-25 DIAGNOSIS — N95.1 POST MENOPAUSAL SYNDROME: ICD-10-CM

## 2025-04-25 DIAGNOSIS — M15.0 PRIMARY OSTEOARTHRITIS INVOLVING MULTIPLE JOINTS: ICD-10-CM

## 2025-04-25 PROCEDURE — 77063 BREAST TOMOSYNTHESIS BI: CPT

## 2025-04-25 PROCEDURE — 77067 SCR MAMMO BI INCL CAD: CPT

## 2025-04-25 PROCEDURE — 77080 DXA BONE DENSITY AXIAL: CPT

## 2025-05-07 DIAGNOSIS — G89.29 CHRONIC BILATERAL LOW BACK PAIN WITHOUT SCIATICA: ICD-10-CM

## 2025-05-07 DIAGNOSIS — M54.50 CHRONIC BILATERAL LOW BACK PAIN WITHOUT SCIATICA: ICD-10-CM

## 2025-05-07 RX ORDER — AMPICILLIN TRIHYDRATE 250 MG
500 CAPSULE ORAL DAILY
OUTPATIENT
Start: 2025-05-07

## 2025-05-07 RX ORDER — MELOXICAM 15 MG/1
15 TABLET ORAL DAILY
Qty: 90 TABLET | Refills: 3 | Status: SHIPPED | OUTPATIENT
Start: 2025-05-07

## 2025-07-21 DIAGNOSIS — E11.65 TYPE 2 DIABETES MELLITUS WITH HYPERGLYCEMIA, WITHOUT LONG-TERM CURRENT USE OF INSULIN: ICD-10-CM

## 2025-07-21 RX ORDER — LANCETS
EACH MISCELLANEOUS
Qty: 100 EACH | Refills: 3 | Status: SHIPPED | OUTPATIENT
Start: 2025-07-21

## 2025-07-21 RX ORDER — BLOOD SUGAR DIAGNOSTIC
STRIP MISCELLANEOUS
Qty: 100 EACH | Refills: 3 | Status: SHIPPED | OUTPATIENT
Start: 2025-07-21

## 2025-08-04 DIAGNOSIS — M54.41 ACUTE RIGHT-SIDED LOW BACK PAIN WITH RIGHT-SIDED SCIATICA: ICD-10-CM

## 2025-08-04 RX ORDER — CYCLOBENZAPRINE HCL 10 MG
5-10 TABLET ORAL NIGHTLY PRN
Qty: 30 TABLET | Refills: 2 | Status: SHIPPED | OUTPATIENT
Start: 2025-08-04

## 2025-08-11 DIAGNOSIS — E11.65 TYPE 2 DIABETES MELLITUS WITH HYPERGLYCEMIA, WITHOUT LONG-TERM CURRENT USE OF INSULIN: ICD-10-CM

## 2025-08-11 RX ORDER — LANCETS
EACH MISCELLANEOUS
Qty: 100 EACH | Refills: 3 | Status: SHIPPED | OUTPATIENT
Start: 2025-08-11

## 2025-08-11 RX ORDER — BLOOD SUGAR DIAGNOSTIC
STRIP MISCELLANEOUS
Qty: 100 EACH | Refills: 3 | Status: SHIPPED | OUTPATIENT
Start: 2025-08-11 | End: 2025-08-13

## 2025-08-13 RX ORDER — BLOOD SUGAR DIAGNOSTIC
STRIP MISCELLANEOUS
Qty: 100 EACH | Refills: 3 | Status: SHIPPED | OUTPATIENT
Start: 2025-08-13

## 2025-08-15 ENCOUNTER — OFFICE VISIT (OUTPATIENT)
Dept: FAMILY MEDICINE CLINIC | Facility: CLINIC | Age: 65
End: 2025-08-15
Payer: MEDICARE

## 2025-08-15 VITALS
BODY MASS INDEX: 24.2 KG/M2 | WEIGHT: 154.2 LBS | TEMPERATURE: 98.6 F | OXYGEN SATURATION: 95 % | SYSTOLIC BLOOD PRESSURE: 128 MMHG | DIASTOLIC BLOOD PRESSURE: 92 MMHG | RESPIRATION RATE: 16 BRPM | HEIGHT: 67 IN | HEART RATE: 104 BPM

## 2025-08-15 DIAGNOSIS — E11.65 TYPE 2 DIABETES MELLITUS WITH HYPERGLYCEMIA, WITHOUT LONG-TERM CURRENT USE OF INSULIN: Primary | ICD-10-CM

## 2025-08-15 DIAGNOSIS — J45.40 MODERATE PERSISTENT ASTHMA, UNSPECIFIED WHETHER COMPLICATED: ICD-10-CM

## 2025-08-15 DIAGNOSIS — M17.11 OSTEOARTHRITIS OF RIGHT KNEE, UNSPECIFIED OSTEOARTHRITIS TYPE: ICD-10-CM

## 2025-08-15 LAB
EXPIRATION DATE: ABNORMAL
HBA1C MFR BLD: 7.7 % (ref 4.5–5.7)
Lab: ABNORMAL

## 2025-08-15 RX ORDER — LISINOPRIL 2.5 MG/1
2.5 TABLET ORAL DAILY
Qty: 90 TABLET | Refills: 3 | Status: SHIPPED | OUTPATIENT
Start: 2025-08-15